# Patient Record
Sex: FEMALE | Race: WHITE | NOT HISPANIC OR LATINO | Employment: UNEMPLOYED | URBAN - METROPOLITAN AREA
[De-identification: names, ages, dates, MRNs, and addresses within clinical notes are randomized per-mention and may not be internally consistent; named-entity substitution may affect disease eponyms.]

---

## 2023-06-20 LAB
ABO GROUP (TYPE) IN BLOOD: NORMAL
ANTIBODY SCREEN: NORMAL
HEPATITIS B VIRUS SURFACE AG PRESENCE IN SERUM: NONREACTIVE
HEPATITIS C VIRUS AB PRESENCE IN SERUM: NONREACTIVE
HIV 1/ 2 AG/AB SCREEN: NONREACTIVE
RH FACTOR: NORMAL
RUBELLA VIRUS IGG AB: POSITIVE
SYPHILIS TOTAL AB: NONREACTIVE
VARICELLA ZOSTER IGG: POSITIVE

## 2023-06-21 LAB
CHLAMYDIA TRACH., AMPLIFIED: NEGATIVE
N. GONORRHEA, AMPLIFIED: NEGATIVE

## 2023-06-24 LAB — ANTI-MULLERIAN HORMONE (AMH): 9.95 NG/ML (ref 0.4–16.02)

## 2023-08-15 LAB — URINE CULTURE: NO GROWTH

## 2023-09-08 LAB
ABO GROUP (TYPE) IN BLOOD: NORMAL
ANTIBODY SCREEN: NORMAL
ERYTHROCYTE DISTRIBUTION WIDTH (RATIO) BY AUTOMATED COUNT: 12.3 % (ref 11.5–14.5)
ERYTHROCYTE MEAN CORPUSCULAR HEMOGLOBIN CONCENTRATION (G/DL) BY AUTOMATED: 32.4 G/DL (ref 32–36)
ERYTHROCYTE MEAN CORPUSCULAR VOLUME (FL) BY AUTOMATED COUNT: 94 FL (ref 80–100)
ERYTHROCYTES (10*6/UL) IN BLOOD BY AUTOMATED COUNT: 4.23 X10E12/L (ref 4–5.2)
HEMATOCRIT (%) IN BLOOD BY AUTOMATED COUNT: 39.8 % (ref 36–46)
HEMOGLOBIN (G/DL) IN BLOOD: 12.9 G/DL (ref 12–16)
HEPATITIS B VIRUS SURFACE AG PRESENCE IN SERUM: NONREACTIVE
HEPATITIS C VIRUS AB PRESENCE IN SERUM: NONREACTIVE
HIV 1/ 2 AG/AB SCREEN: NONREACTIVE
LEUKOCYTES (10*3/UL) IN BLOOD BY AUTOMATED COUNT: 9.4 X10E9/L (ref 4.4–11.3)
PLATELETS (10*3/UL) IN BLOOD AUTOMATED COUNT: 281 X10E9/L (ref 150–450)
REFLEX ADDED, ANEMIA PANEL: NORMAL
RH FACTOR: NORMAL
RUBELLA VIRUS IGG AB: POSITIVE
SYPHILIS TOTAL AB: NONREACTIVE

## 2023-09-09 LAB
CHLAMYDIA TRACH., AMPLIFIED: NEGATIVE
N. GONORRHEA, AMPLIFIED: NEGATIVE

## 2023-10-20 ENCOUNTER — APPOINTMENT (OUTPATIENT)
Dept: OBSTETRICS AND GYNECOLOGY | Facility: CLINIC | Age: 28
End: 2023-10-20
Payer: COMMERCIAL

## 2023-10-23 ENCOUNTER — APPOINTMENT (OUTPATIENT)
Dept: OBSTETRICS AND GYNECOLOGY | Facility: CLINIC | Age: 28
End: 2023-10-23
Payer: COMMERCIAL

## 2023-10-23 ENCOUNTER — ANCILLARY PROCEDURE (OUTPATIENT)
Dept: RADIOLOGY | Facility: CLINIC | Age: 28
End: 2023-10-23
Payer: COMMERCIAL

## 2023-10-23 DIAGNOSIS — Z34.02 ENCOUNTER FOR SUPERVISION OF NORMAL FIRST PREGNANCY IN SECOND TRIMESTER (HHS-HCC): Primary | ICD-10-CM

## 2023-10-23 DIAGNOSIS — Z34.90 ENCOUNTER FOR SUPERVISION OF NORMAL PREGNANCY, UNSPECIFIED, UNSPECIFIED TRIMESTER (HHS-HCC): ICD-10-CM

## 2023-10-23 PROCEDURE — 76811 OB US DETAILED SNGL FETUS: CPT

## 2023-10-23 PROCEDURE — 76811 OB US DETAILED SNGL FETUS: CPT | Performed by: OBSTETRICS & GYNECOLOGY

## 2023-10-24 ENCOUNTER — ROUTINE PRENATAL (OUTPATIENT)
Dept: OBSTETRICS AND GYNECOLOGY | Facility: CLINIC | Age: 28
End: 2023-10-24
Payer: COMMERCIAL

## 2023-10-24 VITALS
DIASTOLIC BLOOD PRESSURE: 80 MMHG | BODY MASS INDEX: 29.31 KG/M2 | SYSTOLIC BLOOD PRESSURE: 124 MMHG | WEIGHT: 204.25 LBS

## 2023-10-24 DIAGNOSIS — R00.2 HEART PALPITATIONS: Primary | ICD-10-CM

## 2023-10-24 DIAGNOSIS — Z3A.19 19 WEEKS GESTATION OF PREGNANCY (HHS-HCC): ICD-10-CM

## 2023-10-24 PROCEDURE — 0501F PRENATAL FLOW SHEET: CPT | Performed by: ADVANCED PRACTICE MIDWIFE

## 2023-10-24 NOTE — PROGRESS NOTES
Subjective     Cat Sarkar is a 28 y.o.  at 19w2d with a working estimated date of delivery of 3/17/2024, by Last Menstrual Period who presents for a routine prenatal visit. No OB complaints but has been having more headaches, hx of migraines. Also has been having palpitations when she lays down or lays on her side for the past 5 weeks.   Of note, pt reports that her 52 yo father had heartache 3 days ago, found congenital heart defect (bicuspid aortic valve). He is in [place] and is planning to have surgery at the [facility] once able to travel  Had her anatomy U/S, Suboptimal views of heart, repeat in 2 weeks. Explained that [facility] may recommend a fetal echo given her fathers CHD.    Her pregnancy is complicated by:  carrier for smith-lemli-opitz   Anterior placenta    Objective   Physical Exam:   Weight: 92.6 kg (204 lb 4 oz)  TW.278 kg (18 lb 4 oz)  Expected Total Weight Gain: 7 kg (15 lb)-11.5 kg (25 lb)   Pregravid BMI: 26.69  BP: 124/80  Fetal Heart Rate: 145               Postpartum Depression: Not on file        Prenatal Labs  Lab Results   Component Value Date    HGB 12.9 2023    HCT 39.8 2023     2023    ABO B 2023    LABRH POS 2023    NEISSGONOAMP NEGATIVE 2023    CHLAMTRACAMP NEGATIVE 2023    SYPHT NONREACTIVE 2023    HEPBSAG NONREACTIVE 2023    HIV1X2 NONREACTIVE 2023    URINECULTURE NO GROWTH 2023         Assessment/Plan   Headaches  Tylenol 1000 mg Q 6 hrs PRN  400 Magnesium for prevention  Adequate hydration  Palpitations  Referral to cardiology  Return for completion of anatomy U/S  Continue prenatal vitamins  Glucola supplies provided to pt to complete at her [facility]  Order 1 hr and CBC at [facility]  Follow up in 5 week(s) for a routine prenatal visit or sooner as needed.    KARY Carl-HUMERA

## 2023-10-27 PROBLEM — N92.6 IRREGULAR MENSES: Status: ACTIVE | Noted: 2023-10-27

## 2023-10-27 PROBLEM — R63.5 ABNORMAL WEIGHT GAIN: Status: ACTIVE | Noted: 2023-10-27

## 2023-10-27 PROBLEM — M54.50 CHRONIC MIDLINE LOW BACK PAIN WITHOUT SCIATICA: Status: ACTIVE | Noted: 2019-10-11

## 2023-10-27 PROBLEM — G89.29 OTHER CHRONIC PAIN: Status: ACTIVE | Noted: 2019-10-11

## 2023-10-27 PROBLEM — J02.9 SORE THROAT: Status: ACTIVE | Noted: 2023-10-27

## 2023-10-27 PROBLEM — G89.29 CHRONIC MIDLINE LOW BACK PAIN WITHOUT SCIATICA: Status: ACTIVE | Noted: 2019-10-11

## 2023-10-27 PROBLEM — N97.9 INFERTILITY, FEMALE: Status: ACTIVE | Noted: 2023-10-27

## 2023-10-30 ENCOUNTER — HOSPITAL ENCOUNTER (OUTPATIENT)
Dept: CARDIOLOGY | Facility: CLINIC | Age: 28
End: 2023-10-30
Payer: COMMERCIAL

## 2023-10-30 ENCOUNTER — HOSPITAL ENCOUNTER (OUTPATIENT)
Dept: CARDIOLOGY | Facility: CLINIC | Age: 28
Discharge: HOME | End: 2023-10-30
Payer: COMMERCIAL

## 2023-10-30 ENCOUNTER — OFFICE VISIT (OUTPATIENT)
Dept: CARDIOLOGY | Facility: CLINIC | Age: 28
End: 2023-10-30
Payer: COMMERCIAL

## 2023-10-30 VITALS
DIASTOLIC BLOOD PRESSURE: 64 MMHG | HEIGHT: 70 IN | HEART RATE: 76 BPM | SYSTOLIC BLOOD PRESSURE: 112 MMHG | BODY MASS INDEX: 29.49 KG/M2 | OXYGEN SATURATION: 97 % | WEIGHT: 206 LBS

## 2023-10-30 DIAGNOSIS — R00.2 PALPITATIONS: ICD-10-CM

## 2023-10-30 DIAGNOSIS — Z34.90 PREGNANCY, UNSPECIFIED GESTATIONAL AGE (HHS-HCC): ICD-10-CM

## 2023-10-30 DIAGNOSIS — Z34.90 ENCOUNTER FOR SUPERVISION OF NORMAL PREGNANCY, UNSPECIFIED, UNSPECIFIED TRIMESTER (HHS-HCC): ICD-10-CM

## 2023-10-30 DIAGNOSIS — R00.2 PALPITATIONS: Primary | ICD-10-CM

## 2023-10-30 PROCEDURE — 93005 ELECTROCARDIOGRAM TRACING: CPT | Performed by: NURSE PRACTITIONER

## 2023-10-30 PROCEDURE — 99213 OFFICE O/P EST LOW 20 MIN: CPT | Mod: 25 | Performed by: NURSE PRACTITIONER

## 2023-10-30 PROCEDURE — 99203 OFFICE O/P NEW LOW 30 MIN: CPT | Performed by: NURSE PRACTITIONER

## 2023-10-30 PROCEDURE — 93010 ELECTROCARDIOGRAM REPORT: CPT | Performed by: INTERNAL MEDICINE

## 2023-10-30 NOTE — PROGRESS NOTES
"Cat Sarkar is a 28 y.o. female     History Of Present Illness   Mrs Sarkar is a 28 year old pregnant female here for concern of \"feeling her heart beat.\"  The patient reports that she is 20 weeks pregnant and feels like her heart is beating hard \"and feels like its under water.\"  She admits that her heart beat is not irregular or fast.  She denies any complaints of chest pain, shortness of breath, lower extremity edema, dizziness or syncope.    She reports that her father was recently dx with a \"bicuspid valve.\"      Social HX  Never smoked  No alcohol or caffeine use during her pregnancy  Social History     Tobacco Use    Smoking status: Former     Types: Cigarettes   Substance Use Topics    Alcohol use: Not Currently    Drug use: Never          Family HX  Family History   Problem Relation Name Age of Onset    Hypertension Father      Diabetes type II Father      Cerebral aneurysm Father      Heart attack Maternal Grandfather      Diabetes type II Paternal Grandmother      Colon cancer Paternal Grandfather      Diabetes type II Paternal Grandfather            Review Of Systems   Constitutional: not feeling tired.   Eyes: no eyesight problems.   ENT: no hearing loss and no nosebleeds.   Cardiovascular: Feels a pronounced heart beat during the evening hours.  Respiratory: no chronic cough and no shortness of breath.   Gastrointestinal: no change in bowel habits and no blood in stools.   Genitourinary: no urinary frequency.   Skin: no skin rashes.   Neurological: no seizures and no frequent falls.   Psychiatric: no depression and not suicidal.   All other systems have been reviewed and are negative for complaint.      Allergies  No Known Allergies       Vitals  There were no vitals taken for this visit.        Physical Exam  Constitutional: alert and in no acute distress.   Eyes: no erythema, swelling or discharge from the eye .   Neck: neck is supple, symmetric, trachea midline, no masses  and no thyromegaly . "   Pulmonary: no increased work of breathing or signs of respiratory distress  and lungs clear to auscultation.  Auscultation of the lungs revealed no expiratory wheezing, normal expiratory time and no inspiratory wheezing. no rales or crackles were heard bilaterally. no rhonchi. no friction rub. no wheezing. no diminished breath sounds. no bronchial breath sounds.   Cardiovascular: carotid pulses 2+ bilaterally with no bruit  right carotid pulse 2+, no bruit heard over the right carotid,   Left carotid pulse 2+ and no bruit heard over the left carotid,   JVP was normal, no thrills ,   Regular rhythm, normal S1 and S2, no murmurs  the heart rate was normal normal S1, normal S2, no S3, no S4 no murmurs were heard.,   pedal pulses 2+ bilaterally  and no edema .   Abdomen: 20 weeks pregnant.  Skin: skin warm and dry, normal skin turgor .   Psychiatric judgment and insight is normal  and oriented to person, place and time .           Current/Home Meds    Current Outpatient Medications:     prenatal no115/iron/folic acid (PRENATAL 19 ORAL), Take 1 tablet by mouth once daily., Disp: , Rfl:        Labs           EKG Findings  EKG: NSR with no arrhythmias or ST changes        Cardiac Service Results:  No results found for this or any previous visit from the past 365 days.        Assessment/Plan      PRONOUNCED PALPITATIONS:  Patient is complaining of pronounced palpitations during the evning hours.  EKG today shows NSR.  May be a result of the increased work load of the heart during her pregnancy, however I will place a 5 day holter monitor and have her under go an echocardiogram.  She will follow up with me after her testing to review the results of her testing.

## 2023-10-31 ENCOUNTER — ROUTINE PRENATAL (OUTPATIENT)
Dept: OBSTETRICS AND GYNECOLOGY | Facility: CLINIC | Age: 28
End: 2023-10-31
Payer: COMMERCIAL

## 2023-10-31 VITALS
SYSTOLIC BLOOD PRESSURE: 110 MMHG | WEIGHT: 205.25 LBS | DIASTOLIC BLOOD PRESSURE: 72 MMHG | BODY MASS INDEX: 29.45 KG/M2

## 2023-10-31 DIAGNOSIS — Z34.02 ENCOUNTER FOR SUPERVISION OF NORMAL FIRST PREGNANCY IN SECOND TRIMESTER (HHS-HCC): Primary | ICD-10-CM

## 2023-10-31 DIAGNOSIS — Z3A.20 20 WEEKS GESTATION OF PREGNANCY (HHS-HCC): ICD-10-CM

## 2023-10-31 PROBLEM — R00.2 PALPITATIONS: Status: ACTIVE | Noted: 2023-10-31

## 2023-10-31 PROBLEM — G89.29 CHRONIC MIDLINE LOW BACK PAIN WITHOUT SCIATICA: Status: RESOLVED | Noted: 2019-10-11 | Resolved: 2023-10-31

## 2023-10-31 PROBLEM — G89.29 OTHER CHRONIC PAIN: Status: RESOLVED | Noted: 2019-10-11 | Resolved: 2023-10-31

## 2023-10-31 PROBLEM — M54.50 CHRONIC MIDLINE LOW BACK PAIN WITHOUT SCIATICA: Status: RESOLVED | Noted: 2019-10-11 | Resolved: 2023-10-31

## 2023-10-31 PROBLEM — N97.9 INFERTILITY, FEMALE: Status: RESOLVED | Noted: 2023-10-27 | Resolved: 2023-10-31

## 2023-10-31 PROBLEM — J02.9 SORE THROAT: Status: RESOLVED | Noted: 2023-10-27 | Resolved: 2023-10-31

## 2023-10-31 PROBLEM — N92.6 IRREGULAR MENSES: Status: RESOLVED | Noted: 2023-10-27 | Resolved: 2023-10-31

## 2023-10-31 LAB
ATRIAL RATE: 68 BPM
P AXIS: 52 DEGREES
P OFFSET: 193 MS
P ONSET: 138 MS
PR INTERVAL: 168 MS
Q ONSET: 222 MS
QRS COUNT: 11 BEATS
QRS DURATION: 84 MS
QT INTERVAL: 400 MS
QTC CALCULATION(BAZETT): 425 MS
QTC FREDERICIA: 417 MS
R AXIS: 55 DEGREES
T AXIS: 6 DEGREES
T OFFSET: 422 MS
VENTRICULAR RATE: 68 BPM

## 2023-10-31 PROCEDURE — 0501F PRENATAL FLOW SHEET: CPT | Performed by: OBSTETRICS & GYNECOLOGY

## 2023-10-31 NOTE — PROGRESS NOTES
Routine prenatal visit     Doing ok.  Has been having palpitations - saw cardiology yesterday and is wearing Holter monitor.  Has echo scheduled.  Also has f/u appt with cards scheduled.  Had normal but incomplete anatomy USN - f/u USN scheduled.  Has glucola supplies for next visit.  Hasn't had flu vaccine - agreed to get today but then left prior to getting - if she hasn't gotten it by next visit, will give then.      Medical Problems       Problem List       Encounter for supervision of normal first pregnancy in second trimester    Overview Addendum 10/31/2023 10:42 AM by Tiffanie Sarmiento MD     - will get flu vaccine today   - Recommended updated COVID vaccine   - Risks-reducing NIPS  - It's a BOY   - normal but incomplete anatomy USN - has f/u USN scheduled 2 weeks          Palpitations    Overview Signed 10/31/2023 10:42 AM by Tiffanie Sarmiento MD     - saw cardiology - has f/u scheduled   - Holter monitor   - Has echo scheduled                Follow up in 4 week(s).

## 2023-11-06 ENCOUNTER — ANCILLARY PROCEDURE (OUTPATIENT)
Dept: RADIOLOGY | Facility: CLINIC | Age: 28
End: 2023-11-06
Payer: COMMERCIAL

## 2023-11-06 DIAGNOSIS — Z34.90 ENCOUNTER FOR SUPERVISION OF NORMAL PREGNANCY, UNSPECIFIED, UNSPECIFIED TRIMESTER (HHS-HCC): ICD-10-CM

## 2023-11-06 PROCEDURE — 76816 OB US FOLLOW-UP PER FETUS: CPT

## 2023-11-06 PROCEDURE — 76816 OB US FOLLOW-UP PER FETUS: CPT | Performed by: OBSTETRICS & GYNECOLOGY

## 2023-11-09 ENCOUNTER — HOSPITAL ENCOUNTER (OUTPATIENT)
Dept: CARDIOLOGY | Facility: CLINIC | Age: 28
Discharge: HOME | End: 2023-11-09
Payer: COMMERCIAL

## 2023-11-09 DIAGNOSIS — Z34.90 PREGNANCY, UNSPECIFIED GESTATIONAL AGE (HHS-HCC): ICD-10-CM

## 2023-11-09 DIAGNOSIS — R00.2 PALPITATIONS: ICD-10-CM

## 2023-11-09 PROCEDURE — 93306 TTE W/DOPPLER COMPLETE: CPT

## 2023-11-09 PROCEDURE — 93306 TTE W/DOPPLER COMPLETE: CPT | Performed by: STUDENT IN AN ORGANIZED HEALTH CARE EDUCATION/TRAINING PROGRAM

## 2023-11-16 LAB
EJECTION FRACTION APICAL 4 CHAMBER: 46.9
EJECTION FRACTION: 52
LEFT ATRIUM VOLUME AREA LENGTH INDEX BSA: 22.7
LEFT VENTRICLE INTERNAL DIMENSION DIASTOLE: 5 (ref 3.5–6)
LEFT VENTRICULAR OUTFLOW TRACT DIAMETER: 1.9
MITRAL VALVE E/A RATIO: 1.97
MITRAL VALVE E/E' RATIO: 8.08
RIGHT VENTRICLE FREE WALL PEAK S': 10.9

## 2023-11-28 ENCOUNTER — ROUTINE PRENATAL (OUTPATIENT)
Dept: OBSTETRICS AND GYNECOLOGY | Facility: CLINIC | Age: 28
End: 2023-11-28
Payer: COMMERCIAL

## 2023-11-28 ENCOUNTER — APPOINTMENT (OUTPATIENT)
Dept: OBSTETRICS AND GYNECOLOGY | Facility: CLINIC | Age: 28
End: 2023-11-28
Payer: COMMERCIAL

## 2023-11-28 VITALS — BODY MASS INDEX: 29.39 KG/M2 | WEIGHT: 204.8 LBS | SYSTOLIC BLOOD PRESSURE: 126 MMHG | DIASTOLIC BLOOD PRESSURE: 70 MMHG

## 2023-11-28 DIAGNOSIS — Z34.02 ENCOUNTER FOR SUPERVISION OF NORMAL FIRST PREGNANCY IN SECOND TRIMESTER (HHS-HCC): Primary | ICD-10-CM

## 2023-11-28 DIAGNOSIS — Z23 ENCOUNTER FOR VACCINATION: ICD-10-CM

## 2023-11-28 DIAGNOSIS — Z13.1 SCREENING FOR DIABETES MELLITUS: ICD-10-CM

## 2023-11-28 DIAGNOSIS — Z3A.24 24 WEEKS GESTATION OF PREGNANCY (HHS-HCC): ICD-10-CM

## 2023-11-28 LAB
ERYTHROCYTE [DISTWIDTH] IN BLOOD BY AUTOMATED COUNT: 13.1 % (ref 11.5–14.5)
GLUCOSE 1H P 50 G GLC PO SERPL-MCNC: 146 MG/DL
HCT VFR BLD AUTO: 35.5 % (ref 36–46)
HGB BLD-MCNC: 11.5 G/DL (ref 12–16)
MCH RBC QN AUTO: 30.9 PG (ref 26–34)
MCHC RBC AUTO-ENTMCNC: 32.4 G/DL (ref 32–36)
MCV RBC AUTO: 95 FL (ref 80–100)
NRBC BLD-RTO: 0 /100 WBCS (ref 0–0)
PLATELET # BLD AUTO: 244 X10*3/UL (ref 150–450)
RBC # BLD AUTO: 3.72 X10*6/UL (ref 4–5.2)
REFLEX ADDED, ANEMIA PANEL: NORMAL
WBC # BLD AUTO: 10.4 X10*3/UL (ref 4.4–11.3)

## 2023-11-28 PROCEDURE — 82947 ASSAY GLUCOSE BLOOD QUANT: CPT

## 2023-11-28 PROCEDURE — 90686 IIV4 VACC NO PRSV 0.5 ML IM: CPT | Performed by: OBSTETRICS & GYNECOLOGY

## 2023-11-28 PROCEDURE — 36415 COLL VENOUS BLD VENIPUNCTURE: CPT

## 2023-11-28 PROCEDURE — 0501F PRENATAL FLOW SHEET: CPT | Performed by: OBSTETRICS & GYNECOLOGY

## 2023-11-28 PROCEDURE — 90471 IMMUNIZATION ADMIN: CPT | Performed by: OBSTETRICS & GYNECOLOGY

## 2023-11-28 PROCEDURE — 85027 COMPLETE CBC AUTOMATED: CPT

## 2023-11-28 NOTE — PROGRESS NOTES
GTT/CBC today, Rh+  FLU shot today    Matilde Blanco MA II    Routine prenatal visit   Says she has been feeling great overall.  Has f/u appt with cards scheduled.  Has f/u USN due to some structures poorly visualized with MFM as well as fetal echo.  Glucola today.  Rh positive.  Flu vaccine given today. Tdap next visit.     Medical Problems       Problem List       Encounter for supervision of normal first pregnancy in second trimester    Overview Addendum 11/28/2023  8:49 AM by Tiffanie Sarmiento MD     - s/p flu vaccine   - Recommended updated COVID vaccine   - Risks-reducing NIPS  - It's a BOY   - Some structures poorly visualized on anatomy USN --> f/u USN scheduled 12/4   - Fetal echo scheduled 12/1          Palpitations    Overview Addendum 11/28/2023  8:49 AM by Tiffanie Sarmiento MD     - saw cardiology - has f/u scheduled with cards on 12/8   - s/p Holter monitor              Follow up in 4 week(s).

## 2023-11-30 DIAGNOSIS — O99.810 IMPAIRED GLUCOSE TOLERANCE DURING PREGNANCY (HHS-HCC): Primary | ICD-10-CM

## 2023-12-01 ENCOUNTER — ANCILLARY PROCEDURE (OUTPATIENT)
Dept: PEDIATRIC CARDIOLOGY | Facility: CLINIC | Age: 28
End: 2023-12-01
Payer: COMMERCIAL

## 2023-12-01 ENCOUNTER — OFFICE VISIT (OUTPATIENT)
Dept: PEDIATRIC CARDIOLOGY | Facility: CLINIC | Age: 28
End: 2023-12-01
Payer: COMMERCIAL

## 2023-12-01 VITALS
DIASTOLIC BLOOD PRESSURE: 68 MMHG | BODY MASS INDEX: 29.7 KG/M2 | WEIGHT: 207.45 LBS | HEART RATE: 66 BPM | HEIGHT: 70 IN | SYSTOLIC BLOOD PRESSURE: 104 MMHG

## 2023-12-01 DIAGNOSIS — O35.9XX0 SUSPECTED FETAL ANOMALY, ANTEPARTUM, SINGLE OR UNSPECIFIED FETUS (HHS-HCC): Primary | ICD-10-CM

## 2023-12-01 DIAGNOSIS — O35.8XX0 MATERNAL CARE FOR OTHER (SUSPECTED) FETAL ABNORMALITY AND DAMAGE, NOT APPLICABLE OR UNSPECIFIED (HHS-HCC): ICD-10-CM

## 2023-12-01 DIAGNOSIS — Z82.79 FAMILY HISTORY OF BICUSPID AORTIC VALVE: ICD-10-CM

## 2023-12-01 PROCEDURE — 76827 ECHO EXAM OF FETAL HEART: CPT | Performed by: PEDIATRICS

## 2023-12-01 PROCEDURE — 76827 ECHO EXAM OF FETAL HEART: CPT

## 2023-12-01 PROCEDURE — 99214 OFFICE O/P EST MOD 30 MIN: CPT | Mod: 25,27 | Performed by: PEDIATRICS

## 2023-12-01 PROCEDURE — 99204 OFFICE O/P NEW MOD 45 MIN: CPT | Performed by: PEDIATRICS

## 2023-12-01 NOTE — PROGRESS NOTES
[facility]  [facility]  Division of Pediatric Cardiology  [facility] [place]   [address], [address], [place], [place] [address]  Tel: [phone], Fax: [phone]     Obstetrician: Tiffanie Sarmiento    Cat Sarkar was seen at the request of Gabe Griffith for incomplete cardiac views on obstetric ultrasound.  Records were reviewed, and a summary of those records is integrated within the history of present illness.  A report with my findings is being sent via written or electronic means to the referring physician with my recommendations    Accompanied by:  Self  History obtained from:  Self    History of Presentation   History of Present Illness:   Cat Sarkar is a 28 y.o. female presenting for initial prenatal cardiology consultation and fetal echocardiogram for incomplete cardiac views on obstetric ultrasound.  She is  and approximately 24w5d weeks pregnant (Patient's last menstrual period was 2023., Estimated Date of Delivery: 3/17/24) with a male fetus.  There have been no complications with her pregnancy.  Ms. Cat Sarkar had a normal first trimester screen.  Her level 2 obstetric ultrasound for anatomy was performed at 18, and 20 weeks gestational age and was normal but with incomplete cardiac views.  She had undergone cell free fetal DNA testing and it was normal.  She has not had invasive genetic testing such as amniocentesis or chorionic villous sampling.  This pregnancy was not the result of in vitro fertilization.  She was not using potentially teratogenic medication at the time of conception.  There have been no other complications of this pregnancy.  Ms. Cat Sarkar plans to deliver her baby at [facility].    Ms. Cat Srakar feels well today.  She denies any shortness of breath, abdominal cramping, contractions, bleeding, or swelling of the extremities.  She notes  frequent fetal movement.        Medical History     Medical Conditions:  Patient Active Problem List   Diagnosis    Encounter for supervision of normal first pregnancy in second trimester    Palpitations     Past Surgeries:  Past Surgical History:   Procedure Laterality Date    OTHER SURGICAL HISTORY  06/24/2022    No history of surgery       Current Outpatient Medications   Medication Instructions    prenatal no115/iron/folic acid (PRENATAL 19 ORAL) 1 tablet, oral, Daily      Allergies:  Patient has no known allergies.    Social History:  Patient lives with  her spouse .    Occupation: student  Smoking: None  Alcohol: None  Drug Use: None  She wears a seatbelt while in the car. She denies any verbal, sexual or physical abuse.     Cardiac Family History (for patient and father of baby):  Cat's father has a bicuspid aortic valve. She has been screened and does not have a bicuspid aortic valve. There is no other history of congenital heart disease.  There is no history of early sudden/unexplained death including SIDS and drowning.  There is no history of cardiomyopathy of any type or heart transplant.  There is no history of arrhythmias/pacemaker/defibrillator or arrhythmia syndromes, including Long QT syndrome, Kathie-Parkinson-White syndrome or Brugada syndrome.  There is no history of heart attack or stroke before the age of 55 years in a close family member.  There is no history of Marfan syndrome or aortic aneurysm.  There is no history of deafness.  There is no history of syncope/fainting.  There is no history of high blood pressure or high cholesterol.  There is no history of DiGeorge Syndrome (22q11).    Physical Examination     [facility] 06/11/2023     General: Alert, well-appearing and in no acute distress.    Abdomen: Soft, nontender, not distended. Gravid.  Extremities: No swelling or edema.  Neurologic / Psychiatric: Grossly intact without focal deficits.  Appropriate demeanor.      Results     Fetal  Echocardiogram:    I ordered and interpreted a transabdominal fetal echocardiogram.  I performed a portion of the study myself.  The complete report is available under separate cover.  The results are summarized as follows:    Image quality: Good  Cardiac situs: Cardiac mass in the left chest.  Left ventricular apex points leftward.  Segmental anatomy: Atrio-ventricular concordance.  Ventriculo-arterial concordance.  Normally-related great arteries.  Superior vena cava: Normal connection to the right atrium.  Inferior vena cava: Normal connection to the right atrium.  Pulmonary veins: At least one pulmonary vein connects normally to the left atrium.  Atrial septum: Patent foramen ovale, with flap valve bowing into the left atrium.  Tricuspid valve: Structurally normal.  No obvious stenosis or insufficiency.  Mitral valve: Structurally normal.  No obvious stenosis or insufficiency.  Right ventricle: Normal ventricular size, wall thickness, and systolic function (qualitative).  Left ventricle: Normal ventricular size, wall thickness, and systolic function (qualitative).  Interventricular septum: No obvious septal defect.  Aortic valve: Structurally normal.  No obvious stenosis or insufficiency.  Pulmonary valve: Structurally normal.  No obvious stenosis or insufficiency.  Pulmonary artery: Normal in size.  Ductal arch: Patent with right to left shunting.  Aortic arch: Left-sided.  Patent.  Pericardial effusion: None.  Umbilical arteries: Two umbilical arteries.  Normal arterial flow pattern.  Umbilical vein: Normal venous flow pattern.  Electrophysiology: Normal fetal heart rate and rhythm.  Normal mechanical [place] interval.      Assessment & Plan   Assessment:  Ms. Cat Sarkar is a 28 y.o. female who is  and currently at 24 5/7 weeks gestational age with a male fetus.  A fetal echocardiogram was performed today for incomplete cardiac views on obstetric ultrasound.     Fetal echocardiogram today demonstrated grossly  normal fetal cardiac anatomy, qualitatively normal fetal cardiac function, normal fetal heart rhythm, and no evidence of in utero congestive heart failure or hydrops fetalis.  I reviewed the results of today's evaluation, including the findings of the fetal echocardiogram, with Ms. Cat Sarkar in detail.      I discussed limitations of the technology of fetal echocardiography with Ms. Cat Sarkar in detail.  I explained that fetal echocardiography cannot exclude all forms of congenital heart disease.  Fetal echocardiography may be insensitive to some defects of atrial and ventricular septation, minor valvular abnormalities, partial anomalous pulmonary venous return, and coarctation of the aorta.  In addition, normal fetal echocardiogram does not ensure that the fetal ductus arteriosus or foramen ovale will close.      Recommendations:  No changes to prenatal care.    Further fetal cardiac imaging is not required at this time.  We would be happy to see Ms. Cat Sarkar in the future if new concerns arise.    Triage code 0:        No changes to delivery planning.  Delivery per obstetrics at patient´s preferred hospital.  Standard  care per  team.        No pediatric cardiology consult needed after birth unless there are concerns/issues.    I spent greater than 60 minutes in performance of this consultation, of which greater than 50% was related to coordination of care or counseling.    This assessment and plan, in addition to the results of relevant testing were explained to Cat. All questions were answered and understanding was demonstrated.    It was a pleasure to see Ms. Cat Sarkar today.  If you have any questions or concerns regarding this evaluation, do not hesitate to contact me.      Chica Mendoza MD, FACC, FAAP   of Pediatrics  Division of Pediatric Cardiology  [facility]  [place] [id number]  Phone: [phone]  Fax: [phone]  e-mail: [email or web address]

## 2023-12-01 NOTE — LETTER
2023     Gabe Griffith MD   [address] [facility]  [facility] [place] [address]  [facility] [address]    Patient: Cat Sarkar   YOB: 1995   Date of Visit: 2023       Dear Dr. Gabe Griffith MD:    Thank you for referring Cat Sarkar to me for evaluation. Below are my notes for this consultation.  If you have questions, please do not hesitate to call me. I look forward to following your patient along with you.       Sincerely,     Chica Mendoza MD      CC: Marlene Patterson, APRN-CNM, DNP  Jade Church, APRN-CNP  Tiffanie Sarmiento MD  ______________________________________________________________________________________         [facility]  [facility]  Division of Pediatric Cardiology  [facility] [address], [address] [address]  Tel: [phone], Fax: [phone]     Obstetrician: Tiffanie Sarmiento    Cat Sarkar was seen at the request of Gabe Griffith for incomplete cardiac views on obstetric ultrasound.  Records were reviewed, and a summary of those records is integrated within the history of present illness.  A report with my findings is being sent via written or electronic means to the referring physician with my recommendations    Accompanied by:  Self  History obtained from:  Self    History of Presentation   History of Present Illness:   Cat Sarkar is a 28 y.o. female presenting for initial prenatal cardiology consultation and fetal echocardiogram for incomplete cardiac views on obstetric ultrasound.  She is  and approximately 24w5d weeks pregnant (Patient's last menstrual period was 2023., Estimated Date of Delivery: 3/17/24) with a male fetus.  There have been no complications with her pregnancy.  Ms. Cat Sarkar had a normal first trimester screen.  Her level 2 obstetric ultrasound for anatomy was  performed at 18, and 20 weeks gestational age and was normal but with incomplete cardiac views.  She had undergone cell free fetal DNA testing and it was normal.  She has not had invasive genetic testing such as amniocentesis or chorionic villous sampling.  This pregnancy was not the result of in vitro fertilization.  She was not using potentially teratogenic medication at the time of conception.  There have been no other complications of this pregnancy.  Ms. Cat Sarkar plans to deliver her baby at [facility].    Ms. Cat Sarkar feels well today.  She denies any shortness of breath, abdominal cramping, contractions, bleeding, or swelling of the extremities.  She notes frequent fetal movement.        Medical History     Medical Conditions:  Patient Active Problem List   Diagnosis   • Encounter for supervision of normal first pregnancy in second trimester   • Palpitations     Past Surgeries:  Past Surgical History:   Procedure Laterality Date   • OTHER SURGICAL HISTORY  06/24/2022    No history of surgery       Current Outpatient Medications   Medication Instructions   • prenatal no115/iron/folic acid (PRENATAL 19 ORAL) 1 tablet, oral, Daily      Allergies:  Patient has no known allergies.    Social History:  Patient lives with  her spouse .    Occupation: student  Smoking: None  Alcohol: None  Drug Use: None  She wears a seatbelt while in the car. She denies any verbal, sexual or physical abuse.     Cardiac Family History (for patient and father of baby):  Cat's father has a bicuspid aortic valve. She has been screened and does not have a bicuspid aortic valve. There is no other history of congenital heart disease.  There is no history of early sudden/unexplained death including SIDS and drowning.  There is no history of cardiomyopathy of any type or heart transplant.  There is no history of arrhythmias/pacemaker/defibrillator or arrhythmia syndromes, including Long QT syndrome, Kathie-Parkinson-White syndrome  or Brugada syndrome.  There is no history of heart attack or stroke before the age of 55 years in a close family member.  There is no history of Marfan syndrome or aortic aneurysm.  There is no history of deafness.  There is no history of syncope/fainting.  There is no history of high blood pressure or high cholesterol.  There is no history of DiGeorge Syndrome (22q11).    Physical Examination     [facility] 06/11/2023     General: Alert, well-appearing and in no acute distress.    Abdomen: Soft, nontender, not distended. Gravid.  Extremities: No swelling or edema.  Neurologic / Psychiatric: Grossly intact without focal deficits.  Appropriate demeanor.      Results     Fetal Echocardiogram:    I ordered and interpreted a transabdominal fetal echocardiogram.  I performed a portion of the study myself.  The complete report is available under separate cover.  The results are summarized as follows:    Image quality: Good  Cardiac situs: Cardiac mass in the left chest.  Left ventricular apex points leftward.  Segmental anatomy: Atrio-ventricular concordance.  Ventriculo-arterial concordance.  Normally-related great arteries.  Superior vena cava: Normal connection to the right atrium.  Inferior vena cava: Normal connection to the right atrium.  Pulmonary veins: At least one pulmonary vein connects normally to the left atrium.  Atrial septum: Patent foramen ovale, with flap valve bowing into the left atrium.  Tricuspid valve: Structurally normal.  No obvious stenosis or insufficiency.  Mitral valve: Structurally normal.  No obvious stenosis or insufficiency.  Right ventricle: Normal ventricular size, wall thickness, and systolic function (qualitative).  Left ventricle: Normal ventricular size, wall thickness, and systolic function (qualitative).  Interventricular septum: No obvious septal defect.  Aortic valve: Structurally normal.  No obvious stenosis or insufficiency.  Pulmonary valve: Structurally normal.  No obvious stenosis  or insufficiency.  Pulmonary artery: Normal in size.  Ductal arch: Patent with right to left shunting.  Aortic arch: Left-sided.  Patent.  Pericardial effusion: None.  Umbilical arteries: Two umbilical arteries.  Normal arterial flow pattern.  Umbilical vein: Normal venous flow pattern.  Electrophysiology: Normal fetal heart rate and rhythm.  Normal mechanical OR interval.      Assessment & Plan   Assessment:  Ms. Cat Sarkar is a 28 y.o. female who is  and currently at 24 5/7 weeks gestational age with a male fetus.  A fetal echocardiogram was performed today for incomplete cardiac views on obstetric ultrasound.     Fetal echocardiogram today demonstrated grossly normal fetal cardiac anatomy, qualitatively normal fetal cardiac function, normal fetal heart rhythm, and no evidence of in utero congestive heart failure or hydrops fetalis.  I reviewed the results of today's evaluation, including the findings of the fetal echocardiogram, with Ms. Cat Sarkar in detail.      I discussed limitations of the technology of fetal echocardiography with Ms. Cat Sarkar in detail.  I explained that fetal echocardiography cannot exclude all forms of congenital heart disease.  Fetal echocardiography may be insensitive to some defects of atrial and ventricular septation, minor valvular abnormalities, partial anomalous pulmonary venous return, and coarctation of the aorta.  In addition, normal fetal echocardiogram does not ensure that the fetal ductus arteriosus or foramen ovale will close.      Recommendations:  No changes to prenatal care.    Further fetal cardiac imaging is not required at this time.  We would be happy to see Ms. Cat Sarkar in the future if new concerns arise.    Triage code 0:        No changes to delivery planning.  Delivery per obstetrics at patient´s preferred hospital.  Standard  care per  team.        No pediatric cardiology consult needed after birth unless there are  concerns/issues.    I spent greater than 60 minutes in performance of this consultation, of which greater than 50% was related to coordination of care or counseling.    This assessment and plan, in addition to the results of relevant testing were explained to Cat. All questions were answered and understanding was demonstrated.    It was a pleasure to see Ms. Cat Sarkar today.  If you have any questions or concerns regarding this evaluation, do not hesitate to contact me.      Chica Mendoza MD, FACC, FAAP   of Pediatrics  Division of Pediatric Cardiology  [facility] [place] [address]  Phone: [phone]  Fax: [phone]  e-mail: [email or web address]

## 2023-12-01 NOTE — PATIENT INSTRUCTIONS
On fetal echocardiogram today the structure, size, function (squeeze), and rhythm of your fetus' heart were normal.  There are some limitations to fetal echocardiogram as described below, and because it is not a perfect test it is important to know that we cannot rule out all possible heart problems (see below).  We will communicate these results with your obstetrician.    It was a pleasure to see you today.  We do not need to see you back for routine follow-up during the remainder of your pregnancy.  However, we would be happy to see you back if new issues or concerns arise.  After birth your baby does not need to see a cardiologist unless the pediatric team has concerns.  If you have any questions or concerns regarding this evaluation, please do not hesitate to contact me.    Chica Mendoza MD   of Pediatrics  Division of Pediatric Cardiology  [place] [address]  Phone: [phone]  Fax: [phone]  e-mail: [email or web address]    [email or web address]    Normal Fetal Echocardiogram    Today you had an ultrasound of your fetus' heart (fetal echocardiogram).  Based on all of the pictures taken today, the heart appears normal and is developing as expected for this point in your pregnancy.   Therefore, no further testing is recommended at this time.    Despite today´s normal findings, it is impossible to rule out all heart problems before birth.  This is partially because the fetus´ heart is so small, and our machine´s technology can only see structures down to a certain size.  It is also because blood flow in a fetus is different and more complicated than blood flow after birth.    Briefly:  ° Fetuses get oxygen from the placenta instead of their lungs.  High-oxygen (red) blood from the placenta comes back to the right side of the fetus´  heart.  ° Red blood crosses to the left side of the heart through a hole between the upper chambers of the heart, the foramen ovale.  The foramen ovale lets the red blood flow to the body.  ° Low-oxygen (blue) blood stays on the right side of the heart.  After leaving the heart, the blue blood flows through a special blood vessel known as the ductus arteriosus.  The ductus arteriosus allows the blue blood to bypass the lungs and return to the placenta to get oxygen.  ° After birth the foramen ovale and ductus arteriosus should close, but sometimes they do not.  It is impossible to predict in which children these structures will remain open.    ° Thus, on fetal echo we cannot rule out that your baby will have a patent foramen ovale (PFO) or patent ductus arteriosus (PDA) after birth.    In addition, fetal echocardiography can miss other heart defects including:  ° Small or medium-sized holes between the upper or lower heart chambers.  ° Minor abnormalities of the heart valves.  ° Narrowing of the main artery that goes to the body (coarctation of the aorta).  ° Other rare abnormalities of the veins or arteries.    If any of these defects are present, there should be findings after birth that will alert your child´s doctor that there is a problem and prompt further evaluation.  After delivery, if your pediatrician has any concerns, your child's heart can be reevaluated at that time.

## 2023-12-04 ENCOUNTER — ANCILLARY PROCEDURE (OUTPATIENT)
Dept: RADIOLOGY | Facility: CLINIC | Age: 28
End: 2023-12-04
Payer: COMMERCIAL

## 2023-12-04 DIAGNOSIS — Z34.02 ENCOUNTER FOR SUPERVISION OF NORMAL FIRST PREGNANCY IN SECOND TRIMESTER (HHS-HCC): ICD-10-CM

## 2023-12-04 PROCEDURE — 76816 OB US FOLLOW-UP PER FETUS: CPT | Performed by: OBSTETRICS & GYNECOLOGY

## 2023-12-04 PROCEDURE — 76816 OB US FOLLOW-UP PER FETUS: CPT

## 2023-12-07 ENCOUNTER — LAB (OUTPATIENT)
Dept: LAB | Facility: LAB | Age: 28
End: 2023-12-07
Payer: COMMERCIAL

## 2023-12-07 DIAGNOSIS — O99.810 IMPAIRED GLUCOSE TOLERANCE DURING PREGNANCY (HHS-HCC): ICD-10-CM

## 2023-12-07 LAB
GLUCOSE 1H P 100 G GLC PO SERPL-MCNC: 157 MG/DL
GLUCOSE P FAST SERPL-MCNC: 84 MG/DL

## 2023-12-07 PROCEDURE — 82951 GLUCOSE TOLERANCE TEST (GTT): CPT

## 2023-12-07 PROCEDURE — 82952 GTT-ADDED SAMPLES: CPT

## 2023-12-07 PROCEDURE — 36415 COLL VENOUS BLD VENIPUNCTURE: CPT

## 2023-12-07 PROCEDURE — 82950 GLUCOSE TEST: CPT

## 2023-12-08 ENCOUNTER — OFFICE VISIT (OUTPATIENT)
Dept: CARDIOLOGY | Facility: CLINIC | Age: 28
End: 2023-12-08
Payer: COMMERCIAL

## 2023-12-08 ENCOUNTER — HOSPITAL ENCOUNTER (OUTPATIENT)
Dept: CARDIOLOGY | Facility: CLINIC | Age: 28
End: 2023-12-08
Payer: COMMERCIAL

## 2023-12-08 VITALS
OXYGEN SATURATION: 97 % | HEART RATE: 79 BPM | WEIGHT: 210 LBS | BODY MASS INDEX: 30.06 KG/M2 | HEIGHT: 70 IN | DIASTOLIC BLOOD PRESSURE: 65 MMHG | SYSTOLIC BLOOD PRESSURE: 108 MMHG

## 2023-12-08 DIAGNOSIS — R00.2 PALPITATIONS: Primary | ICD-10-CM

## 2023-12-08 LAB
GLUCOSE 2H P 100 G GLC PO SERPL-MCNC: 125 MG/DL
GLUCOSE 3H P 100 G GLC PO SERPL-MCNC: 88 MG/DL

## 2023-12-08 PROCEDURE — 99212 OFFICE O/P EST SF 10 MIN: CPT | Performed by: NURSE PRACTITIONER

## 2023-12-08 ASSESSMENT — ENCOUNTER SYMPTOMS
NEUROLOGICAL NEGATIVE: 1
HEMATOLOGIC/LYMPHATIC NEGATIVE: 1
RESPIRATORY NEGATIVE: 1
ALLERGIC/IMMUNOLOGIC NEGATIVE: 1
ENDOCRINE NEGATIVE: 1
CONSTITUTIONAL NEGATIVE: 1
PALPITATIONS: 1
PSYCHIATRIC NEGATIVE: 1
EYES NEGATIVE: 1
MUSCULOSKELETAL NEGATIVE: 1

## 2023-12-08 NOTE — PROGRESS NOTES
Cat Sarkar is a 28 y.o. female     History Of Present Illness   Mrs Sarkar is a 25 week pregnant female here for a follow up of her palpiations and to review the results of her holter monitor and echocardiogram.  She states her pregnancy is progressing well with no complications.  She continues to have short episodes of palpiations, however they resolve on their own within seconds,.      Social HX  Social History     Tobacco Use    Smoking status: Former     Types: Cigarettes   Substance Use Topics    Alcohol use: Not Currently    Drug use: Never          Family HX  Family History   Problem Relation Name Age of Onset    Hypertension Father      Diabetes type II Father      Cerebral aneurysm Father      Heart attack Maternal Grandfather      Diabetes type II Paternal Grandmother      Colon cancer Paternal Grandfather      Diabetes type II Paternal Grandfather            Review Of Systems   Review of Systems   Constitutional: Negative.    HENT: Negative.     Eyes: Negative.    Respiratory: Negative.     Cardiovascular:  Positive for palpitations. Negative for chest pain and leg swelling.   Endocrine: Negative.    Genitourinary: Negative.    Musculoskeletal: Negative.    Skin: Negative.    Allergic/Immunologic: Negative.    Neurological: Negative.    Hematological: Negative.    Psychiatric/Behavioral: Negative.            Allergies  No Known Allergies       Vitals  There were no vitals taken for this visit.        Physical Exam  Physical Exam  Cardiovascular:      Rate and Rhythm: Normal rate and regular rhythm.      Chest Wall: No thrill.      Pulses: Normal pulses.      Heart sounds: Normal heart sounds, S1 normal and S2 normal. No murmur heard.     No friction rub. No gallop.   Pulmonary:      Effort: Pulmonary effort is normal.      Breath sounds: Normal breath sounds.   Abdominal:      Comments: 25 weeks pregnant   Musculoskeletal:         General: Normal range of motion.   Skin:     General: Skin is warm and dry.       Capillary Refill: Capillary refill takes less than 2 seconds.   Neurological:      General: No focal deficit present.      Mental Status: She is oriented to person, place, and time.   Psychiatric:         Mood and Affect: Mood normal.         Behavior: Behavior normal.         Thought Content: Thought content normal.         Judgment: Judgment normal.            Cardiac Service Results:  11/9/23 ECHO  1. Left ventricular systolic function is normal with a 55-60% estimated ejection fraction.     HOLTER MONITOR:  Sinus rhythm with sinus tachycardia with sinus arrhythmia  Max  (ONE OCCURRENCE OF VTACH WITH THE LONGEST EPISODE 4 BEATS)  MIN HR 55  AVERAGE HR 82  SVE <1%    Assessment/Plan      Palpitations:  I have reviewed her echo results with the patient that shows a normal LV systolic function with an EF of 55-60%  Her holter monitor showed a very bried (4 beat ) episode of tachycardia.  The patient notes these episodes of palpitations are only lasting seconds.  We discussed starting a beta blocker, however if these episodes are not distressing her that I feel we should wait until after her pregnancy.  She is to increase her fluid intake and change positions slowly.  Her BP is very well controlled at 108/65.  She will follow up with me after she has her son, however she can call me sooner for any questions or concerns.

## 2023-12-29 ENCOUNTER — ANCILLARY PROCEDURE (OUTPATIENT)
Dept: RADIOLOGY | Facility: CLINIC | Age: 28
End: 2023-12-29
Payer: COMMERCIAL

## 2023-12-29 DIAGNOSIS — Z34.02 ENCOUNTER FOR SUPERVISION OF NORMAL FIRST PREGNANCY IN SECOND TRIMESTER (HHS-HCC): ICD-10-CM

## 2023-12-29 PROCEDURE — 76816 OB US FOLLOW-UP PER FETUS: CPT | Performed by: OBSTETRICS & GYNECOLOGY

## 2023-12-29 PROCEDURE — 76819 FETAL BIOPHYS PROFIL W/O NST: CPT

## 2023-12-29 PROCEDURE — 76819 FETAL BIOPHYS PROFIL W/O NST: CPT | Performed by: OBSTETRICS & GYNECOLOGY

## 2023-12-29 PROCEDURE — 76816 OB US FOLLOW-UP PER FETUS: CPT

## 2024-01-03 ENCOUNTER — ROUTINE PRENATAL (OUTPATIENT)
Dept: OBSTETRICS AND GYNECOLOGY | Facility: CLINIC | Age: 29
End: 2024-01-03
Payer: COMMERCIAL

## 2024-01-03 VITALS — DIASTOLIC BLOOD PRESSURE: 70 MMHG | WEIGHT: 217.6 LBS | BODY MASS INDEX: 31.22 KG/M2 | SYSTOLIC BLOOD PRESSURE: 122 MMHG

## 2024-01-03 DIAGNOSIS — Z23 ENCOUNTER FOR VACCINATION: Primary | ICD-10-CM

## 2024-01-03 DIAGNOSIS — Z34.02 ENCOUNTER FOR SUPERVISION OF NORMAL FIRST PREGNANCY IN SECOND TRIMESTER (HHS-HCC): ICD-10-CM

## 2024-01-03 DIAGNOSIS — Z3A.29 29 WEEKS GESTATION OF PREGNANCY (HHS-HCC): ICD-10-CM

## 2024-01-03 PROCEDURE — 90471 IMMUNIZATION ADMIN: CPT | Performed by: OBSTETRICS & GYNECOLOGY

## 2024-01-03 PROCEDURE — 0501F PRENATAL FLOW SHEET: CPT | Performed by: OBSTETRICS & GYNECOLOGY

## 2024-01-03 PROCEDURE — 90715 TDAP VACCINE 7 YRS/> IM: CPT | Performed by: OBSTETRICS & GYNECOLOGY

## 2024-01-03 NOTE — PROGRESS NOTES
Birthing questions. Tdap today.     Matilde Blanco MA II     Routine prenatal visit   Doing well.  Tdap given today.  Had normal growth USN- no additional USNs recommended.  Normal fetal echo. Already got breast pump in the mail.  Discussed normal FM.     Medical Problems       Problem List       Encounter for supervision of normal first pregnancy in second trimester    Overview Addendum 1/3/2024 11:47 AM by Tiffanie Sarmiento MD     - s/p flu vaccine   - Recommended updated COVID vaccine   - s/p Tdap vaccine   - Risks-reducing NIPS  - It's a BOY   - normal fetal echo   - Has breast pump          Palpitations    Overview Addendum 11/28/2023  8:49 AM by Tiffanie Sarmiento MD     - saw cardiology - has f/u scheduled with cards on 12/8   - s/p Holter monitor          Suspected fetal anomaly, antepartum        Follow up in 2 week(s).

## 2024-01-16 ENCOUNTER — ROUTINE PRENATAL (OUTPATIENT)
Dept: OBSTETRICS AND GYNECOLOGY | Facility: CLINIC | Age: 29
End: 2024-01-16
Payer: COMMERCIAL

## 2024-01-16 VITALS — SYSTOLIC BLOOD PRESSURE: 126 MMHG | WEIGHT: 217.8 LBS | BODY MASS INDEX: 31.25 KG/M2 | DIASTOLIC BLOOD PRESSURE: 64 MMHG

## 2024-01-16 DIAGNOSIS — Z34.03 ENCOUNTER FOR SUPERVISION OF NORMAL FIRST PREGNANCY IN THIRD TRIMESTER (HHS-HCC): ICD-10-CM

## 2024-01-16 DIAGNOSIS — Z3A.31 31 WEEKS GESTATION OF PREGNANCY (HHS-HCC): Primary | ICD-10-CM

## 2024-01-16 PROBLEM — O35.9XX0 SUSPECTED FETAL ANOMALY, ANTEPARTUM (HHS-HCC): Status: RESOLVED | Noted: 2023-12-01 | Resolved: 2024-01-16

## 2024-01-16 PROCEDURE — 0501F PRENATAL FLOW SHEET: CPT | Performed by: OBSTETRICS & GYNECOLOGY

## 2024-01-16 NOTE — PROGRESS NOTES
Routine prenatal visit   Feeling OK.  Discussed RSV vaccine - pt says she does not plan to get.  Normal FM.      Medical Problems       Problem List       Encounter for supervision of normal first pregnancy in second trimester    Overview Addendum 1/16/2024  9:49 AM by Tiffanie Sarmiento MD     - s/p flu vaccine   - Recommended updated COVID vaccine   - s/p Tdap vaccine   - Risks-reducing NIPS  - It's a BOY   - normal fetal echo   - Has breast pump   - Discussed RSV vaccine -does not plan to get          Palpitations    Overview Addendum 11/28/2023  8:49 AM by Tiffanie Sarmiento MD     - saw cardiology - has f/u scheduled with cards on 12/8   - s/p Holter monitor              Follow up in 2 week(s).

## 2024-01-30 ENCOUNTER — ROUTINE PRENATAL (OUTPATIENT)
Dept: OBSTETRICS AND GYNECOLOGY | Facility: CLINIC | Age: 29
End: 2024-01-30
Payer: COMMERCIAL

## 2024-01-30 VITALS — DIASTOLIC BLOOD PRESSURE: 64 MMHG | WEIGHT: 221.2 LBS | SYSTOLIC BLOOD PRESSURE: 118 MMHG | BODY MASS INDEX: 31.74 KG/M2

## 2024-01-30 DIAGNOSIS — Z3A.33 33 WEEKS GESTATION OF PREGNANCY (HHS-HCC): ICD-10-CM

## 2024-01-30 DIAGNOSIS — Z34.02 ENCOUNTER FOR SUPERVISION OF NORMAL FIRST PREGNANCY IN SECOND TRIMESTER (HHS-HCC): Primary | ICD-10-CM

## 2024-01-30 PROCEDURE — 0501F PRENATAL FLOW SHEET: CPT | Performed by: OBSTETRICS & GYNECOLOGY

## 2024-01-30 NOTE — PROGRESS NOTES
Routine prenatal visit   No complaints today.  Feeling well.  Normal FM. Took hospital tour and was happy.  Discussed how to reach the on-call physician after-hours.     Medical Problems       Problem List       Encounter for supervision of normal first pregnancy in second trimester    Overview Addendum 1/16/2024  9:49 AM by Tiffanie Sarmiento MD     - s/p flu vaccine   - Recommended updated COVID vaccine   - s/p Tdap vaccine   - Risks-reducing NIPS  - It's a BOY   - normal fetal echo   - Has breast pump   - Discussed RSV vaccine -does not plan to get          Palpitations    Overview Addendum 1/30/2024  1:22 PM by Tiffanie Sarmiento MD     - saw cardiology  - s/p Holter monitor              Follow up in 2 week(s).

## 2024-02-12 ENCOUNTER — ROUTINE PRENATAL (OUTPATIENT)
Dept: OBSTETRICS AND GYNECOLOGY | Facility: CLINIC | Age: 29
End: 2024-02-12
Payer: COMMERCIAL

## 2024-02-12 VITALS
DIASTOLIC BLOOD PRESSURE: 68 MMHG | SYSTOLIC BLOOD PRESSURE: 122 MMHG | BODY MASS INDEX: 31.91 KG/M2 | WEIGHT: 222.38 LBS

## 2024-02-12 DIAGNOSIS — Z3A.35 35 WEEKS GESTATION OF PREGNANCY (HHS-HCC): Primary | ICD-10-CM

## 2024-02-12 PROCEDURE — 0501F PRENATAL FLOW SHEET: CPT | Performed by: ADVANCED PRACTICE MIDWIFE

## 2024-02-12 RX ORDER — FAMOTIDINE 10 MG/1
10 TABLET ORAL DAILY
COMMUNITY
End: 2024-03-06 | Stop reason: ALTCHOICE

## 2024-02-12 NOTE — PROGRESS NOTES
Subjective     Cat Sarkar is a 28 y.o.  at 35w1d with a working estimated date of delivery of 3/17/2024, by Last Menstrual Period who presents for a routine prenatal visit. Endorses good fetal movement, denies vaginal bleeding, leakage of fluid, or regular contractions.    Tums no longer providing heartburn relief, started OTC Pepcid.    Her pregnancy is complicated by:  Pregnancy Problems (from 21 to present)       Problem Noted Resolved    Encounter for supervision of normal first pregnancy in second trimester 10/23/2023 by Tiffanie Sarmiento MD No    Priority:  Medium      Overview Addendum 2024  9:49 AM by Tiffanie Sarmiento MD     - s/p flu vaccine   - Recommended updated COVID vaccine   - s/p Tdap vaccine   - Risks-reducing NIPS  - It's a BOY   - normal fetal echo   - Has breast pump   - Discussed RSV vaccine -does not plan to get          Suspected fetal anomaly, antepartum 2023 by Chica Mendoza MD 2024 by Tiffanie Sarmiento MD             Objective   Physical Exam:   Weight: 101 kg (222 lb 6 oz)  TW.5 kg (36 lb 6 oz)  Expected Total Weight Gain: 7 kg (15 lb)-11.5 kg (25 lb)   Pregravid BMI: 26.69  BP: 122/68  Fetal Heart Rate: 128 Fundal Height (cm): 35 cm             Postpartum Depression: Not on file        Prenatal Labs  Lab Results   Component Value Date    HGB 11.5 (L) 2023    HCT 35.5 (L) 2023     2023    ABO B 2023    LABRH POS 2023    NEISSGONOAMP NEGATIVE 2023    CHLAMTRACAMP NEGATIVE 2023    SYPHT NONREACTIVE 2023    HEPBSAG NONREACTIVE 2023    HIV1X2 NONREACTIVE 2023    URINECULTURE NO GROWTH 2023     Lab Results   Component Value Date    GLUC1P 146 (H) 2023       Assessment/Plan   28 y.o.  at 35w1d  Continue prenatal vitamins  Advised of GBS culture next visit    Reviewed warning signs, fetal movement counts, and when to call provider    Follow up in 1 week(s) for a  routine prenatal visit or sooner as needed.    Nadine Thompson, [id number]

## 2024-02-21 ENCOUNTER — ROUTINE PRENATAL (OUTPATIENT)
Dept: OBSTETRICS AND GYNECOLOGY | Facility: CLINIC | Age: 29
End: 2024-02-21
Payer: COMMERCIAL

## 2024-02-21 VITALS — BODY MASS INDEX: 32.11 KG/M2 | WEIGHT: 223.8 LBS | DIASTOLIC BLOOD PRESSURE: 78 MMHG | SYSTOLIC BLOOD PRESSURE: 128 MMHG

## 2024-02-21 DIAGNOSIS — Z34.03 ENCOUNTER FOR SUPERVISION OF NORMAL FIRST PREGNANCY IN THIRD TRIMESTER (HHS-HCC): Primary | ICD-10-CM

## 2024-02-21 DIAGNOSIS — Z3A.36 36 WEEKS GESTATION OF PREGNANCY (HHS-HCC): ICD-10-CM

## 2024-02-21 PROCEDURE — 87081 CULTURE SCREEN ONLY: CPT

## 2024-02-21 PROCEDURE — 0501F PRENATAL FLOW SHEET: CPT | Performed by: OBSTETRICS & GYNECOLOGY

## 2024-02-21 NOTE — PROGRESS NOTES
GBS today    Matilde Blanco MA    Routine prenatal visit   Feeling OK.  Discussed kick counts. GBS done today. Cervix 1/50/-2.     Medical Problems       Problem List       Encounter for supervision of normal first pregnancy in second trimester    Overview Addendum 1/16/2024  9:49 AM by Tiffanie Sarmiento MD     - s/p flu vaccine   - Recommended updated COVID vaccine   - s/p Tdap vaccine   - Risks-reducing NIPS  - It's a BOY   - normal fetal echo   - Has breast pump   - Discussed RSV vaccine -does not plan to get          Palpitations    Overview Addendum 1/30/2024  1:22 PM by Tiffanie Sarmiento MD     - saw cardiology  - s/p Holter monitor              Follow up in 1 week(s).

## 2024-02-23 LAB — GP B STREP GENITAL QL CULT: NORMAL

## 2024-02-27 ENCOUNTER — ROUTINE PRENATAL (OUTPATIENT)
Dept: OBSTETRICS AND GYNECOLOGY | Facility: CLINIC | Age: 29
End: 2024-02-27
Payer: COMMERCIAL

## 2024-02-27 VITALS — WEIGHT: 224 LBS | SYSTOLIC BLOOD PRESSURE: 110 MMHG | DIASTOLIC BLOOD PRESSURE: 72 MMHG | BODY MASS INDEX: 32.14 KG/M2

## 2024-02-27 DIAGNOSIS — Z3A.37 37 WEEKS GESTATION OF PREGNANCY (HHS-HCC): ICD-10-CM

## 2024-02-27 DIAGNOSIS — Z34.02 ENCOUNTER FOR SUPERVISION OF NORMAL FIRST PREGNANCY IN SECOND TRIMESTER (HHS-HCC): Primary | ICD-10-CM

## 2024-02-27 PROCEDURE — 0501F PRENATAL FLOW SHEET: CPT | Performed by: OBSTETRICS & GYNECOLOGY

## 2024-02-27 NOTE — PROGRESS NOTES
GBS negative.    Matilde Blanco MA II    Routine prenatal visit   Doing OK.  Had a headache this morning which resolved with Tylenol. Normal BP today.  Call parameters reviewed.  Discussed GBS negative status.  Labor precautions reviewed.     Medical Problems       Problem List       Encounter for supervision of normal first pregnancy in second trimester    Overview Addendum 1/16/2024  9:49 AM by Tiffanie Sarmiento MD     - s/p flu vaccine   - Recommended updated COVID vaccine   - s/p Tdap vaccine   - Risks-reducing NIPS  - It's a BOY   - normal fetal echo   - Has breast pump   - Discussed RSV vaccine -does not plan to get          Palpitations    Overview Addendum 1/30/2024  1:22 PM by Tiffanie Sarmiento MD     - saw cardiology  - s/p Holter monitor              Follow up in 1 week(s).

## 2024-02-28 ENCOUNTER — TELEPHONE (OUTPATIENT)
Dept: OBSTETRICS AND GYNECOLOGY | Facility: CLINIC | Age: 29
End: 2024-02-28

## 2024-02-28 NOTE — TELEPHONE ENCOUNTER
Patient called in wanting someone to reach out to them regarding a concern they were having. Patient stated they have been having a headache since Monday and nothing she has taken has been working for the pain.

## 2024-03-06 ENCOUNTER — ROUTINE PRENATAL (OUTPATIENT)
Dept: OBSTETRICS AND GYNECOLOGY | Facility: CLINIC | Age: 29
End: 2024-03-06
Payer: COMMERCIAL

## 2024-03-06 VITALS — WEIGHT: 225.8 LBS | DIASTOLIC BLOOD PRESSURE: 76 MMHG | SYSTOLIC BLOOD PRESSURE: 130 MMHG | BODY MASS INDEX: 32.4 KG/M2

## 2024-03-06 DIAGNOSIS — O36.8130 DECREASED FETAL MOVEMENTS IN THIRD TRIMESTER, SINGLE OR UNSPECIFIED FETUS (HHS-HCC): ICD-10-CM

## 2024-03-06 DIAGNOSIS — Z34.02 ENCOUNTER FOR SUPERVISION OF NORMAL FIRST PREGNANCY IN SECOND TRIMESTER (HHS-HCC): Primary | ICD-10-CM

## 2024-03-06 DIAGNOSIS — K21.9 GASTROESOPHAGEAL REFLUX DISEASE WITHOUT ESOPHAGITIS: ICD-10-CM

## 2024-03-06 DIAGNOSIS — Z3A.38 38 WEEKS GESTATION OF PREGNANCY (HHS-HCC): ICD-10-CM

## 2024-03-06 PROCEDURE — 0501F PRENATAL FLOW SHEET: CPT | Performed by: OBSTETRICS & GYNECOLOGY

## 2024-03-06 PROCEDURE — 59025 FETAL NON-STRESS TEST: CPT | Performed by: OBSTETRICS & GYNECOLOGY

## 2024-03-06 RX ORDER — PANTOPRAZOLE SODIUM 20 MG/1
20 TABLET, DELAYED RELEASE ORAL
Qty: 30 TABLET | Refills: 11 | Status: SHIPPED | OUTPATIENT
Start: 2024-03-06 | End: 2024-05-01 | Stop reason: WASHOUT

## 2024-03-06 NOTE — PROCEDURES
Cat Sarkar, a  at 38w3d with an IMANI of 3/17/2024, by Last Menstrual Period, was seen at [facility] for a nonstress test.    Non-Stress Test   Baseline Fetal Heart Rate for Non-Stress Test: 125 BPM  Variability in Waveform for Non-Stress Test: Moderate  Accelerations in Non-Stress Test: Yes  Decelerations in Non-Stress Test: None  Contractions in Non-Stress Test: Not present  Acoustic Stimulator for Non-Stress Test: No  Interpretation of Non-Stress Test   Interpretation of Non-Stress Test: Reactive

## 2024-03-06 NOTE — PROGRESS NOTES
Swelling in ankles, DFM the last 3 days.    Matilde Blanco MA II     Routine prenatal visit   Has noticed a decrease in fetal movement over the past 2-3 days.  Feeling movements during her visit today.  NST reactive.  Wants cervical exam today - 1.5/50/-3.     Medical Problems       Problem List       Encounter for supervision of normal first pregnancy in second trimester    Overview Addendum 1/16/2024  9:49 AM by Tiffanie Sarmiento MD     - s/p flu vaccine   - Recommended updated COVID vaccine   - s/p Tdap vaccine   - Risks-reducing NIPS  - It's a BOY   - normal fetal echo   - Has breast pump   - Discussed RSV vaccine -does not plan to get          Palpitations    Overview Addendum 1/30/2024  1:22 PM by Tiffanie Sarmiento MD     - saw cardiology  - s/p Holter monitor              Follow up in 1 week(s).

## 2024-03-08 ENCOUNTER — APPOINTMENT (OUTPATIENT)
Dept: OBSTETRICS AND GYNECOLOGY | Facility: CLINIC | Age: 29
End: 2024-03-08
Payer: COMMERCIAL

## 2024-03-11 ENCOUNTER — ROUTINE PRENATAL (OUTPATIENT)
Dept: OBSTETRICS AND GYNECOLOGY | Facility: CLINIC | Age: 29
End: 2024-03-11
Payer: COMMERCIAL

## 2024-03-11 ENCOUNTER — PREP FOR PROCEDURE (OUTPATIENT)
Dept: OBSTETRICS AND GYNECOLOGY | Facility: HOSPITAL | Age: 29
End: 2024-03-11

## 2024-03-11 VITALS — DIASTOLIC BLOOD PRESSURE: 64 MMHG | WEIGHT: 227.6 LBS | BODY MASS INDEX: 32.66 KG/M2 | SYSTOLIC BLOOD PRESSURE: 114 MMHG

## 2024-03-11 DIAGNOSIS — Z34.02 ENCOUNTER FOR SUPERVISION OF NORMAL FIRST PREGNANCY IN SECOND TRIMESTER (HHS-HCC): Primary | ICD-10-CM

## 2024-03-11 DIAGNOSIS — Z3A.39 39 WEEKS GESTATION OF PREGNANCY (HHS-HCC): ICD-10-CM

## 2024-03-11 DIAGNOSIS — Z34.03 ENCOUNTER FOR SUPERVISION OF NORMAL FIRST PREGNANCY IN THIRD TRIMESTER (HHS-HCC): Primary | ICD-10-CM

## 2024-03-11 PROCEDURE — 0501F PRENATAL FLOW SHEET: CPT | Performed by: OBSTETRICS & GYNECOLOGY

## 2024-03-11 NOTE — PROGRESS NOTES
Cervix check    Matilde Blanco MA II    Routine prenatal visit   No issues today but wants to discuss scheduling IOL around EDC.  Not feeling contractions.  IOL scheduled at 8pm on 3/17.  Labor precautions reviewed.     Medical Problems       Problem List       Encounter for supervision of normal first pregnancy in second trimester    Overview Addendum 1/16/2024  9:49 AM by Tiffanie Sarmiento MD     - s/p flu vaccine   - Recommended updated COVID vaccine   - s/p Tdap vaccine   - Risks-reducing NIPS  - It's a BOY   - normal fetal echo   - Has breast pump   - Discussed RSV vaccine -does not plan to get          Palpitations    Overview Addendum 1/30/2024  1:22 PM by Tiffanie Sarmiento MD     - saw cardiology  - s/p Holter monitor              Follow up in 1 week(s).

## 2024-03-14 ENCOUNTER — APPOINTMENT (OUTPATIENT)
Dept: OBSTETRICS AND GYNECOLOGY | Facility: CLINIC | Age: 29
End: 2024-03-14
Payer: COMMERCIAL

## 2024-03-17 ENCOUNTER — ANESTHESIA (OUTPATIENT)
Dept: OBSTETRICS AND GYNECOLOGY | Facility: HOSPITAL | Age: 29
End: 2024-03-17
Payer: COMMERCIAL

## 2024-03-17 ENCOUNTER — APPOINTMENT (OUTPATIENT)
Dept: OBSTETRICS AND GYNECOLOGY | Facility: HOSPITAL | Age: 29
End: 2024-03-17
Payer: COMMERCIAL

## 2024-03-17 ENCOUNTER — ANESTHESIA EVENT (OUTPATIENT)
Dept: OBSTETRICS AND GYNECOLOGY | Facility: HOSPITAL | Age: 29
End: 2024-03-17
Payer: COMMERCIAL

## 2024-03-17 ENCOUNTER — HOSPITAL ENCOUNTER (INPATIENT)
Facility: HOSPITAL | Age: 29
LOS: 3 days | Discharge: HOME | End: 2024-03-20
Attending: OBSTETRICS & GYNECOLOGY | Admitting: OBSTETRICS & GYNECOLOGY
Payer: COMMERCIAL

## 2024-03-17 DIAGNOSIS — Z34.02 ENCOUNTER FOR SUPERVISION OF NORMAL FIRST PREGNANCY IN SECOND TRIMESTER (HHS-HCC): ICD-10-CM

## 2024-03-17 DIAGNOSIS — R52 POSTPARTUM PAIN (HHS-HCC): Primary | ICD-10-CM

## 2024-03-17 PROBLEM — K21.9 GASTROESOPHAGEAL REFLUX DISEASE: Status: ACTIVE | Noted: 2024-03-17

## 2024-03-17 PROBLEM — Z3A.40 40 WEEKS GESTATION OF PREGNANCY (HHS-HCC): Status: ACTIVE | Noted: 2024-03-17

## 2024-03-17 LAB
ABO GROUP (TYPE) IN BLOOD: NORMAL
ANTIBODY SCREEN: NORMAL
ERYTHROCYTE [DISTWIDTH] IN BLOOD BY AUTOMATED COUNT: 12.9 % (ref 11.5–14.5)
HCT VFR BLD AUTO: 34.9 % (ref 36–46)
HGB BLD-MCNC: 11.6 G/DL (ref 12–16)
MCH RBC QN AUTO: 31.4 PG (ref 26–34)
MCHC RBC AUTO-ENTMCNC: 33.2 G/DL (ref 32–36)
MCV RBC AUTO: 95 FL (ref 80–100)
NRBC BLD-RTO: 0 /100 WBCS (ref 0–0)
PLATELET # BLD AUTO: 293 X10*3/UL (ref 150–450)
RBC # BLD AUTO: 3.69 X10*6/UL (ref 4–5.2)
RH FACTOR (ANTIGEN D): NORMAL
WBC # BLD AUTO: 14.7 X10*3/UL (ref 4.4–11.3)

## 2024-03-17 PROCEDURE — 85027 COMPLETE CBC AUTOMATED: CPT | Performed by: OBSTETRICS & GYNECOLOGY

## 2024-03-17 PROCEDURE — 1120000001 HC OB PRIVATE ROOM DAILY

## 2024-03-17 PROCEDURE — 86901 BLOOD TYPING SEROLOGIC RH(D): CPT | Performed by: OBSTETRICS & GYNECOLOGY

## 2024-03-17 PROCEDURE — 2500000001 HC RX 250 WO HCPCS SELF ADMINISTERED DRUGS (ALT 637 FOR MEDICARE OP): Performed by: OBSTETRICS & GYNECOLOGY

## 2024-03-17 PROCEDURE — 7210000002 HC LABOR PER HOUR

## 2024-03-17 PROCEDURE — 36415 COLL VENOUS BLD VENIPUNCTURE: CPT | Performed by: OBSTETRICS & GYNECOLOGY

## 2024-03-17 PROCEDURE — 2500000001 HC RX 250 WO HCPCS SELF ADMINISTERED DRUGS (ALT 637 FOR MEDICARE OP)

## 2024-03-17 PROCEDURE — 86780 TREPONEMA PALLIDUM: CPT | Mod: STJLAB | Performed by: OBSTETRICS & GYNECOLOGY

## 2024-03-17 RX ORDER — LIDOCAINE HYDROCHLORIDE 10 MG/ML
30 INJECTION INFILTRATION; PERINEURAL ONCE AS NEEDED
Status: DISCONTINUED | OUTPATIENT
Start: 2024-03-17 | End: 2024-03-18

## 2024-03-17 RX ORDER — METHYLERGONOVINE MALEATE 0.2 MG/ML
0.2 INJECTION INTRAVENOUS ONCE AS NEEDED
Status: DISCONTINUED | OUTPATIENT
Start: 2024-03-17 | End: 2024-03-18

## 2024-03-17 RX ORDER — ONDANSETRON HYDROCHLORIDE 2 MG/ML
4 INJECTION, SOLUTION INTRAVENOUS EVERY 6 HOURS PRN
Status: DISCONTINUED | OUTPATIENT
Start: 2024-03-17 | End: 2024-03-18

## 2024-03-17 RX ORDER — NIFEDIPINE 10 MG/1
10 CAPSULE ORAL ONCE AS NEEDED
Status: DISCONTINUED | OUTPATIENT
Start: 2024-03-17 | End: 2024-03-18

## 2024-03-17 RX ORDER — CARBOPROST TROMETHAMINE 250 UG/ML
250 INJECTION, SOLUTION INTRAMUSCULAR ONCE AS NEEDED
Status: DISCONTINUED | OUTPATIENT
Start: 2024-03-17 | End: 2024-03-18

## 2024-03-17 RX ORDER — MISOPROSTOL 200 UG/1
800 TABLET ORAL ONCE AS NEEDED
Status: DISCONTINUED | OUTPATIENT
Start: 2024-03-17 | End: 2024-03-18

## 2024-03-17 RX ORDER — OXYTOCIN/0.9 % SODIUM CHLORIDE 30/500 ML
60 PLASTIC BAG, INJECTION (ML) INTRAVENOUS ONCE AS NEEDED
Status: COMPLETED | OUTPATIENT
Start: 2024-03-17 | End: 2024-03-18

## 2024-03-17 RX ORDER — OXYTOCIN 10 [USP'U]/ML
10 INJECTION, SOLUTION INTRAMUSCULAR; INTRAVENOUS ONCE AS NEEDED
Status: DISCONTINUED | OUTPATIENT
Start: 2024-03-17 | End: 2024-03-18

## 2024-03-17 RX ORDER — ONDANSETRON 4 MG/1
4 TABLET, FILM COATED ORAL EVERY 6 HOURS PRN
Status: DISCONTINUED | OUTPATIENT
Start: 2024-03-17 | End: 2024-03-18

## 2024-03-17 RX ORDER — TERBUTALINE SULFATE 1 MG/ML
0.25 INJECTION SUBCUTANEOUS ONCE AS NEEDED
Status: DISCONTINUED | OUTPATIENT
Start: 2024-03-17 | End: 2024-03-18

## 2024-03-17 RX ORDER — SODIUM CHLORIDE, SODIUM LACTATE, POTASSIUM CHLORIDE, CALCIUM CHLORIDE 600; 310; 30; 20 MG/100ML; MG/100ML; MG/100ML; MG/100ML
125 INJECTION, SOLUTION INTRAVENOUS CONTINUOUS
Status: DISCONTINUED | OUTPATIENT
Start: 2024-03-17 | End: 2024-03-18

## 2024-03-17 RX ORDER — METOCLOPRAMIDE 10 MG/1
10 TABLET ORAL EVERY 6 HOURS PRN
Status: DISCONTINUED | OUTPATIENT
Start: 2024-03-17 | End: 2024-03-18

## 2024-03-17 RX ORDER — LABETALOL HYDROCHLORIDE 5 MG/ML
20 INJECTION, SOLUTION INTRAVENOUS ONCE AS NEEDED
Status: DISCONTINUED | OUTPATIENT
Start: 2024-03-17 | End: 2024-03-18

## 2024-03-17 RX ORDER — HYDRALAZINE HYDROCHLORIDE 20 MG/ML
5 INJECTION INTRAMUSCULAR; INTRAVENOUS ONCE AS NEEDED
Status: DISCONTINUED | OUTPATIENT
Start: 2024-03-17 | End: 2024-03-18

## 2024-03-17 RX ORDER — TRANEXAMIC ACID 100 MG/ML
1000 INJECTION, SOLUTION INTRAVENOUS ONCE AS NEEDED
Status: DISCONTINUED | OUTPATIENT
Start: 2024-03-17 | End: 2024-03-18

## 2024-03-17 RX ORDER — LOPERAMIDE HYDROCHLORIDE 2 MG/1
4 CAPSULE ORAL EVERY 2 HOUR PRN
Status: DISCONTINUED | OUTPATIENT
Start: 2024-03-17 | End: 2024-03-18

## 2024-03-17 RX ORDER — METOCLOPRAMIDE HYDROCHLORIDE 5 MG/ML
10 INJECTION INTRAMUSCULAR; INTRAVENOUS EVERY 6 HOURS PRN
Status: DISCONTINUED | OUTPATIENT
Start: 2024-03-17 | End: 2024-03-18

## 2024-03-17 RX ADMIN — MISOPROSTOL 25 MCG: 100 TABLET ORAL at 21:10

## 2024-03-17 RX ADMIN — MISOPROSTOL 25 MCG: 100 TABLET ORAL at 23:05

## 2024-03-17 SDOH — HEALTH STABILITY: MENTAL HEALTH: HOW OFTEN DO YOU HAVE A DRINK CONTAINING ALCOHOL?: NEVER

## 2024-03-17 SDOH — ECONOMIC STABILITY: FOOD INSECURITY: WITHIN THE PAST 12 MONTHS, THE FOOD YOU BOUGHT JUST DIDN'T LAST AND YOU DIDN'T HAVE MONEY TO GET MORE.: NEVER TRUE

## 2024-03-17 SDOH — SOCIAL STABILITY: SOCIAL INSECURITY: DOES ANYONE TRY TO KEEP YOU FROM HAVING/CONTACTING OTHER FRIENDS OR DOING THINGS OUTSIDE YOUR HOME?: NO

## 2024-03-17 SDOH — HEALTH STABILITY: MENTAL HEALTH: HAVE YOU USED ANY SUBSTANCES (CANABIS, COCAINE, HEROIN, HALLUCINOGENS, INHALANTS, ETC.) IN THE PAST 12 MONTHS?: NO

## 2024-03-17 SDOH — HEALTH STABILITY: MENTAL HEALTH: HOW MANY STANDARD DRINKS CONTAINING ALCOHOL DO YOU HAVE ON A TYPICAL DAY?: PATIENT DOES NOT DRINK

## 2024-03-17 SDOH — ECONOMIC STABILITY: FOOD INSECURITY: WITHIN THE PAST 12 MONTHS, YOU WORRIED THAT YOUR FOOD WOULD RUN OUT BEFORE YOU GOT MONEY TO BUY MORE.: NEVER TRUE

## 2024-03-17 SDOH — HEALTH STABILITY: MENTAL HEALTH
STRESS IS WHEN SOMEONE FEELS TENSE, NERVOUS, ANXIOUS, OR CAN'T SLEEP AT NIGHT BECAUSE THEIR MIND IS TROUBLED. HOW STRESSED ARE YOU?: NOT AT ALL

## 2024-03-17 SDOH — HEALTH STABILITY: MENTAL HEALTH: CURRENT SMOKER: 0

## 2024-03-17 SDOH — HEALTH STABILITY: MENTAL HEALTH: SUICIDAL BEHAVIOR (LIFETIME): NO

## 2024-03-17 SDOH — SOCIAL STABILITY: SOCIAL INSECURITY: ARE THERE ANY APPARENT SIGNS OF INJURIES/BEHAVIORS THAT COULD BE RELATED TO ABUSE/NEGLECT?: NO

## 2024-03-17 SDOH — HEALTH STABILITY: MENTAL HEALTH: WISH TO BE DEAD (PAST 1 MONTH): NO

## 2024-03-17 SDOH — SOCIAL STABILITY: SOCIAL INSECURITY: HAS ANYONE EVER THREATENED TO HURT YOUR FAMILY OR YOUR PETS?: NO

## 2024-03-17 SDOH — SOCIAL STABILITY: SOCIAL INSECURITY: ARE YOU OR HAVE YOU BEEN THREATENED OR ABUSED PHYSICALLY, EMOTIONALLY, OR SEXUALLY BY ANYONE?: NO

## 2024-03-17 SDOH — HEALTH STABILITY: MENTAL HEALTH: HAVE YOU USED ANY PRESCRIPTION DRUGS OTHER THAN PRESCRIBED IN THE PAST 12 MONTHS?: NO

## 2024-03-17 SDOH — HEALTH STABILITY: MENTAL HEALTH: NON-SPECIFIC ACTIVE SUICIDAL THOUGHTS (PAST 1 MONTH): NO

## 2024-03-17 SDOH — ECONOMIC STABILITY: HOUSING INSECURITY: DO YOU FEEL UNSAFE GOING BACK TO THE PLACE WHERE YOU ARE LIVING?: NO

## 2024-03-17 SDOH — SOCIAL STABILITY: SOCIAL INSECURITY: HAVE YOU HAD THOUGHTS OF HARMING ANYONE ELSE?: NO

## 2024-03-17 SDOH — SOCIAL STABILITY: SOCIAL INSECURITY: ABUSE SCREEN: ADULT

## 2024-03-17 SDOH — SOCIAL STABILITY: SOCIAL INSECURITY: PHYSICAL ABUSE: DENIES

## 2024-03-17 SDOH — SOCIAL STABILITY: SOCIAL INSECURITY: DO YOU FEEL ANYONE HAS EXPLOITED OR TAKEN ADVANTAGE OF YOU FINANCIALLY OR OF YOUR PERSONAL PROPERTY?: NO

## 2024-03-17 SDOH — HEALTH STABILITY: MENTAL HEALTH: WERE YOU ABLE TO COMPLETE ALL THE BEHAVIORAL HEALTH SCREENINGS?: YES

## 2024-03-17 SDOH — SOCIAL STABILITY: SOCIAL INSECURITY: VERBAL ABUSE: DENIES

## 2024-03-17 ASSESSMENT — PAIN SCALES - GENERAL
PAINLEVEL_OUTOF10: 0 - NO PAIN

## 2024-03-17 ASSESSMENT — ACTIVITIES OF DAILY LIVING (ADL)
BATHING: INDEPENDENT
HEARING - RIGHT EAR: FUNCTIONAL
FEEDING YOURSELF: INDEPENDENT
LACK_OF_TRANSPORTATION: NO
WALKS IN HOME: INDEPENDENT
ADEQUATE_TO_COMPLETE_ADL: YES
GROOMING: INDEPENDENT
TOILETING: INDEPENDENT
HEARING - LEFT EAR: FUNCTIONAL
JUDGMENT_ADEQUATE_SAFELY_COMPLETE_DAILY_ACTIVITIES: YES
PATIENT'S MEMORY ADEQUATE TO SAFELY COMPLETE DAILY ACTIVITIES?: YES
DRESSING YOURSELF: INDEPENDENT

## 2024-03-17 ASSESSMENT — LIFESTYLE VARIABLES
SKIP TO QUESTIONS 9-10: 1
AUDIT-C TOTAL SCORE: 0
HOW MANY STANDARD DRINKS CONTAINING ALCOHOL DO YOU HAVE ON A TYPICAL DAY: PATIENT DOES NOT DRINK
AUDIT-C TOTAL SCORE: 0
HOW OFTEN DO YOU HAVE A DRINK CONTAINING ALCOHOL: NEVER
HOW OFTEN DO YOU HAVE 6 OR MORE DRINKS ON ONE OCCASION: NEVER

## 2024-03-17 ASSESSMENT — PATIENT HEALTH QUESTIONNAIRE - PHQ9
2. FEELING DOWN, DEPRESSED OR HOPELESS: NOT AT ALL
SUM OF ALL RESPONSES TO PHQ9 QUESTIONS 1 & 2: 0
1. LITTLE INTEREST OR PLEASURE IN DOING THINGS: NOT AT ALL

## 2024-03-18 LAB — TREPONEMA PALLIDUM IGG+IGM AB [PRESENCE] IN SERUM OR PLASMA BY IMMUNOASSAY: NONREACTIVE

## 2024-03-18 PROCEDURE — 59050 FETAL MONITOR W/REPORT: CPT

## 2024-03-18 PROCEDURE — 2500000004 HC RX 250 GENERAL PHARMACY W/ HCPCS (ALT 636 FOR OP/ED): Performed by: OBSTETRICS & GYNECOLOGY

## 2024-03-18 PROCEDURE — 7210000002 HC LABOR PER HOUR

## 2024-03-18 PROCEDURE — 7100000016 HC LABOR RECOVERY PER HOUR

## 2024-03-18 PROCEDURE — 01967 NEURAXL LBR ANES VAG DLVR: CPT | Performed by: NURSE ANESTHETIST, CERTIFIED REGISTERED

## 2024-03-18 PROCEDURE — 2500000001 HC RX 250 WO HCPCS SELF ADMINISTERED DRUGS (ALT 637 FOR MEDICARE OP): Performed by: OBSTETRICS & GYNECOLOGY

## 2024-03-18 PROCEDURE — 2500000004 HC RX 250 GENERAL PHARMACY W/ HCPCS (ALT 636 FOR OP/ED): Performed by: NURSE ANESTHETIST, CERTIFIED REGISTERED

## 2024-03-18 PROCEDURE — 59400 OBSTETRICAL CARE: CPT | Performed by: OBSTETRICS & GYNECOLOGY

## 2024-03-18 PROCEDURE — 3E033VJ INTRODUCTION OF OTHER HORMONE INTO PERIPHERAL VEIN, PERCUTANEOUS APPROACH: ICD-10-PCS | Performed by: OBSTETRICS & GYNECOLOGY

## 2024-03-18 PROCEDURE — 0HQ9XZZ REPAIR PERINEUM SKIN, EXTERNAL APPROACH: ICD-10-PCS | Performed by: OBSTETRICS & GYNECOLOGY

## 2024-03-18 PROCEDURE — 59409 OBSTETRICAL CARE: CPT | Performed by: OBSTETRICS & GYNECOLOGY

## 2024-03-18 PROCEDURE — 1120000001 HC OB PRIVATE ROOM DAILY

## 2024-03-18 RX ORDER — FAMOTIDINE 20 MG/1
20 TABLET, FILM COATED ORAL 2 TIMES DAILY
Status: DISCONTINUED | OUTPATIENT
Start: 2024-03-18 | End: 2024-03-18

## 2024-03-18 RX ORDER — FENTANYL/ROPIVACAINE/NS/PF 2MCG/ML-.2
0-25 PLASTIC BAG, INJECTION (ML) INJECTION CONTINUOUS
Status: DISCONTINUED | OUTPATIENT
Start: 2024-03-18 | End: 2024-03-18

## 2024-03-18 RX ORDER — OXYTOCIN 10 [USP'U]/ML
10 INJECTION, SOLUTION INTRAMUSCULAR; INTRAVENOUS ONCE AS NEEDED
Status: DISCONTINUED | OUTPATIENT
Start: 2024-03-18 | End: 2024-03-20 | Stop reason: HOSPADM

## 2024-03-18 RX ORDER — LABETALOL HYDROCHLORIDE 5 MG/ML
20 INJECTION, SOLUTION INTRAVENOUS ONCE AS NEEDED
Status: DISCONTINUED | OUTPATIENT
Start: 2024-03-18 | End: 2024-03-20 | Stop reason: HOSPADM

## 2024-03-18 RX ORDER — ONDANSETRON 4 MG/1
4 TABLET, FILM COATED ORAL EVERY 6 HOURS PRN
Status: DISCONTINUED | OUTPATIENT
Start: 2024-03-18 | End: 2024-03-20 | Stop reason: HOSPADM

## 2024-03-18 RX ORDER — ONDANSETRON HYDROCHLORIDE 2 MG/ML
4 INJECTION, SOLUTION INTRAVENOUS EVERY 6 HOURS PRN
Status: DISCONTINUED | OUTPATIENT
Start: 2024-03-18 | End: 2024-03-20 | Stop reason: HOSPADM

## 2024-03-18 RX ORDER — DOCUSATE SODIUM 100 MG/1
100 CAPSULE, LIQUID FILLED ORAL 2 TIMES DAILY
Status: DISCONTINUED | OUTPATIENT
Start: 2024-03-18 | End: 2024-03-20 | Stop reason: HOSPADM

## 2024-03-18 RX ORDER — ACETAMINOPHEN 325 MG/1
975 TABLET ORAL ONCE
Status: COMPLETED | OUTPATIENT
Start: 2024-03-18 | End: 2024-03-18

## 2024-03-18 RX ORDER — LOPERAMIDE HYDROCHLORIDE 2 MG/1
4 CAPSULE ORAL EVERY 2 HOUR PRN
Status: DISCONTINUED | OUTPATIENT
Start: 2024-03-18 | End: 2024-03-20 | Stop reason: HOSPADM

## 2024-03-18 RX ORDER — OXYTOCIN/0.9 % SODIUM CHLORIDE 30/500 ML
2-30 PLASTIC BAG, INJECTION (ML) INTRAVENOUS CONTINUOUS
Status: DISCONTINUED | OUTPATIENT
Start: 2024-03-18 | End: 2024-03-18

## 2024-03-18 RX ORDER — IBUPROFEN 600 MG/1
600 TABLET ORAL EVERY 6 HOURS
Status: DISCONTINUED | OUTPATIENT
Start: 2024-03-18 | End: 2024-03-20 | Stop reason: HOSPADM

## 2024-03-18 RX ORDER — ACETAMINOPHEN 325 MG/1
975 TABLET ORAL EVERY 6 HOURS
Status: DISCONTINUED | OUTPATIENT
Start: 2024-03-18 | End: 2024-03-20 | Stop reason: HOSPADM

## 2024-03-18 RX ORDER — DIPHENHYDRAMINE HCL 25 MG
25 TABLET ORAL EVERY 6 HOURS PRN
Status: DISCONTINUED | OUTPATIENT
Start: 2024-03-18 | End: 2024-03-20 | Stop reason: HOSPADM

## 2024-03-18 RX ORDER — ENOXAPARIN SODIUM 100 MG/ML
40 INJECTION SUBCUTANEOUS EVERY 24 HOURS
Status: DISCONTINUED | OUTPATIENT
Start: 2024-03-19 | End: 2024-03-20 | Stop reason: HOSPADM

## 2024-03-18 RX ORDER — METHYLERGONOVINE MALEATE 0.2 MG/ML
0.2 INJECTION INTRAVENOUS ONCE AS NEEDED
Status: DISCONTINUED | OUTPATIENT
Start: 2024-03-18 | End: 2024-03-20 | Stop reason: HOSPADM

## 2024-03-18 RX ORDER — HYDRALAZINE HYDROCHLORIDE 20 MG/ML
5 INJECTION INTRAMUSCULAR; INTRAVENOUS ONCE AS NEEDED
Status: DISCONTINUED | OUTPATIENT
Start: 2024-03-18 | End: 2024-03-20 | Stop reason: HOSPADM

## 2024-03-18 RX ORDER — ADHESIVE BANDAGE
10 BANDAGE TOPICAL
Status: DISCONTINUED | OUTPATIENT
Start: 2024-03-18 | End: 2024-03-20 | Stop reason: HOSPADM

## 2024-03-18 RX ORDER — CARBOPROST TROMETHAMINE 250 UG/ML
250 INJECTION, SOLUTION INTRAMUSCULAR ONCE AS NEEDED
Status: DISCONTINUED | OUTPATIENT
Start: 2024-03-18 | End: 2024-03-20 | Stop reason: HOSPADM

## 2024-03-18 RX ORDER — TRANEXAMIC ACID 100 MG/ML
1000 INJECTION, SOLUTION INTRAVENOUS ONCE AS NEEDED
Status: DISCONTINUED | OUTPATIENT
Start: 2024-03-18 | End: 2024-03-20 | Stop reason: HOSPADM

## 2024-03-18 RX ORDER — BISACODYL 10 MG/1
10 SUPPOSITORY RECTAL DAILY PRN
Status: DISCONTINUED | OUTPATIENT
Start: 2024-03-18 | End: 2024-03-20 | Stop reason: HOSPADM

## 2024-03-18 RX ORDER — OXYTOCIN/0.9 % SODIUM CHLORIDE 30/500 ML
60 PLASTIC BAG, INJECTION (ML) INTRAVENOUS ONCE AS NEEDED
Status: DISCONTINUED | OUTPATIENT
Start: 2024-03-18 | End: 2024-03-20 | Stop reason: HOSPADM

## 2024-03-18 RX ORDER — DIPHENHYDRAMINE HYDROCHLORIDE 50 MG/ML
25 INJECTION INTRAMUSCULAR; INTRAVENOUS EVERY 6 HOURS PRN
Status: DISCONTINUED | OUTPATIENT
Start: 2024-03-18 | End: 2024-03-20 | Stop reason: HOSPADM

## 2024-03-18 RX ORDER — MINERAL OIL
OIL (ML) ORAL
Status: DISPENSED
Start: 2024-03-18 | End: 2024-03-19

## 2024-03-18 RX ORDER — POLYETHYLENE GLYCOL 3350 17 G/17G
17 POWDER, FOR SOLUTION ORAL 2 TIMES DAILY PRN
Status: DISCONTINUED | OUTPATIENT
Start: 2024-03-18 | End: 2024-03-20 | Stop reason: HOSPADM

## 2024-03-18 RX ORDER — SIMETHICONE 80 MG
80 TABLET,CHEWABLE ORAL 4 TIMES DAILY PRN
Status: DISCONTINUED | OUTPATIENT
Start: 2024-03-18 | End: 2024-03-20 | Stop reason: HOSPADM

## 2024-03-18 RX ORDER — LIDOCAINE 560 MG/1
1 PATCH PERCUTANEOUS; TOPICAL; TRANSDERMAL
Status: DISCONTINUED | OUTPATIENT
Start: 2024-03-18 | End: 2024-03-20 | Stop reason: HOSPADM

## 2024-03-18 RX ORDER — MISOPROSTOL 200 UG/1
800 TABLET ORAL ONCE AS NEEDED
Status: DISCONTINUED | OUTPATIENT
Start: 2024-03-18 | End: 2024-03-20 | Stop reason: HOSPADM

## 2024-03-18 RX ORDER — FAMOTIDINE 20 MG/1
20 TABLET, FILM COATED ORAL 2 TIMES DAILY
Status: DISCONTINUED | OUTPATIENT
Start: 2024-03-18 | End: 2024-03-20 | Stop reason: HOSPADM

## 2024-03-18 RX ORDER — NIFEDIPINE 10 MG/1
10 CAPSULE ORAL ONCE AS NEEDED
Status: DISCONTINUED | OUTPATIENT
Start: 2024-03-18 | End: 2024-03-20 | Stop reason: HOSPADM

## 2024-03-18 RX ADMIN — ACETAMINOPHEN 975 MG: 325 TABLET ORAL at 17:10

## 2024-03-18 RX ADMIN — SODIUM CHLORIDE, POTASSIUM CHLORIDE, SODIUM LACTATE AND CALCIUM CHLORIDE 125 ML/HR: 600; 310; 30; 20 INJECTION, SOLUTION INTRAVENOUS at 04:45

## 2024-03-18 RX ADMIN — SODIUM CHLORIDE, POTASSIUM CHLORIDE, SODIUM LACTATE AND CALCIUM CHLORIDE 125 ML/HR: 600; 310; 30; 20 INJECTION, SOLUTION INTRAVENOUS at 08:22

## 2024-03-18 RX ADMIN — FAMOTIDINE 20 MG: 20 TABLET, FILM COATED ORAL at 22:51

## 2024-03-18 RX ADMIN — IBUPROFEN 600 MG: 600 TABLET, FILM COATED ORAL at 22:51

## 2024-03-18 RX ADMIN — SODIUM CHLORIDE, POTASSIUM CHLORIDE, SODIUM LACTATE AND CALCIUM CHLORIDE 500 ML: 600; 310; 30; 20 INJECTION, SOLUTION INTRAVENOUS at 09:06

## 2024-03-18 RX ADMIN — Medication 10 ML/HR: at 04:37

## 2024-03-18 RX ADMIN — MISOPROSTOL 25 MCG: 100 TABLET ORAL at 01:00

## 2024-03-18 RX ADMIN — ACETAMINOPHEN 975 MG: 325 TABLET ORAL at 11:10

## 2024-03-18 RX ADMIN — ACETAMINOPHEN 975 MG: 325 TABLET ORAL at 22:51

## 2024-03-18 RX ADMIN — SODIUM CHLORIDE, POTASSIUM CHLORIDE, SODIUM LACTATE AND CALCIUM CHLORIDE 1000 ML: 600; 310; 30; 20 INJECTION, SOLUTION INTRAVENOUS at 04:10

## 2024-03-18 RX ADMIN — Medication 60 MILLI-UNITS/MIN: at 15:03

## 2024-03-18 RX ADMIN — FAMOTIDINE 20 MG: 20 TABLET, FILM COATED ORAL at 05:57

## 2024-03-18 RX ADMIN — IBUPROFEN 600 MG: 600 TABLET, FILM COATED ORAL at 17:10

## 2024-03-18 RX ADMIN — Medication 2 MILLI-UNITS/MIN: at 07:57

## 2024-03-18 RX ADMIN — DOCUSATE SODIUM 100 MG: 100 CAPSULE, LIQUID FILLED ORAL at 22:51

## 2024-03-18 RX ADMIN — Medication 10 ML/HR: at 13:10

## 2024-03-18 RX ADMIN — SODIUM CHLORIDE, POTASSIUM CHLORIDE, SODIUM LACTATE AND CALCIUM CHLORIDE 125 ML/HR: 600; 310; 30; 20 INJECTION, SOLUTION INTRAVENOUS at 13:18

## 2024-03-18 ASSESSMENT — PAIN DESCRIPTION - LOCATION: LOCATION: PERINEUM

## 2024-03-18 ASSESSMENT — PAIN SCALES - GENERAL
PAINLEVEL_OUTOF10: 4
PAINLEVEL_OUTOF10: 0 - NO PAIN
PAINLEVEL_OUTOF10: 4
PAINLEVEL_OUTOF10: 0 - NO PAIN
PAINLEVEL_OUTOF10: 2
PAINLEVEL_OUTOF10: 0 - NO PAIN
PAINLEVEL_OUTOF10: 2
PAINLEVEL_OUTOF10: 6
PAINLEVEL_OUTOF10: 2
PAINLEVEL_OUTOF10: 8

## 2024-03-18 ASSESSMENT — PAIN - FUNCTIONAL ASSESSMENT
PAIN_FUNCTIONAL_ASSESSMENT: 0-10
PAIN_FUNCTIONAL_ASSESSMENT: 0-10

## 2024-03-18 NOTE — ANESTHESIA PROCEDURE NOTES
Epidural Block    Patient location during procedure: OB  Start time: 3/18/2024 4:13 AM  Reason for block: labor analgesia  Staffing  Performed: KEEGAN   Authorized by: SAVITA Vieira    Performed by: SAVITA Vieira    Preanesthetic Checklist  Completed: patient identified, IV checked, risks and benefits discussed, surgical consent, monitors and equipment checked, pre-op evaluation, timeout performed and sterile techniques followed  Block Timeout  RN/Licensed healthcare professional reads aloud to the Anesthesia provider and entire team: Patient identity, procedure with side and site, patient position, and as applicable the availability of implants/special equipment/special requirements.  Patient on coagulant treatment: no  Timeout performed at: 3/18/2024 4:15 AM  Block Placement  Patient position: sitting  Prep: DuraPrep  Sterility prep: hand, mask, cap, gloves and drape  Sedation level: no sedation  Patient monitoring: blood pressure, continuous pulse oximetry and heart rate  Approach: midline  Local numbing: lidocaine 1% to skin and subcutaneous tissues  Vertebral space: lumbar  Lumbar location: L2-L3  Epidural  Loss of resistance technique: saline  Guidance: landmark technique        Needle  Needle type: Tuohy   Needle gauge: 17  Needle length: 10.2 cm  Needle insertion depth: 4.5 cm  Catheter type: end hole  Catheter size: 19 G  Catheter at skin depth: 9 cm  Catheter securement method: clear occlusive dressing    Test dose: lidocaine 1.5% with epinephrine 1-to-200,000  Test dose: lidocaine 1.5% with epinephrine 1-to-200,000  Test dose result: no positive test dose    PCEA  Medication concentration used: 0.2% Ropivacaine with 2 mcg/mL Fentanyl  Dose (mL): 5  Lockout (minutes): 20  1-Hour Limit (boluses/hr): 3  Basal Rate: 10        Assessment  Block outcome: pain improved  Number of attempts: 1  Events: no positive test dose and negative heme/CSF  Procedure assessment: patient tolerated  procedure well with no immediate complications  Additional Notes  Labor pain prior to epidural placement 10/10    When placing the epidural needle patient felt paresthesia right buttocks, adjusted needle slight left and the paresthesia resolved.

## 2024-03-18 NOTE — L&D DELIVERY NOTE
OB Delivery Note  3/18/2024  Cat Sarkar  28 y.o.   Vaginal, Spontaneous        Gestational Age: 40w1d  /Para:   Quantitative Blood Loss: Admission to Discharge: 410 mL (3/17/2024  7:56 PM - 3/18/2024  5:56 PM)    Clementine Sarkar [[id number]]      Labor Events    Sac identifier: Sac 1  Rupture date/time: 3/17/2024 2100  Rupture type: Spontaneous  Fluid color: Clear  Fluid odor: None  Labor type: Induced Onset of Labor  Labor allowed to proceed with plans for an attempted vaginal birth?: Yes  Induction: Oxytocin  Induction indications: Risk Reducing  Complications: None       Labor Event Times    Labor onset date/time: 3/18/2024 2040  Dilation complete date/time: 3/18/2024 1355  Start pushing date/time: 3/18/2024 135       Labor Length    2nd stage: 1h 02m  3rd stage: 0h 05m       Placenta    Placenta delivery date/time: 3/18/2024 1502  Placenta removal: Spontaneous  Placenta appearance: Intact  Placenta disposition: discarded       Cord    Vessels: 3 vessels  Complications: Nuchal  Nuchal intervention: reduced  Number of loops: 2  Delayed cord clamping?: Yes  Gases sent?: No       Lacerations    Episiotomy: None  Perineal laceration: None  Labial laceration?: Yes  Labial laceration location: left  Labial laceration repaired?: Yes  Vaginal laceration?: Yes  Vaginal laceration repaired?: Yes  Repair suture: 3-0 Synthetic Suture, 4-0 Synthetic Suture       Anesthesia    Method: Epidural       Operative Delivery    Forceps attempted?: No  Vacuum extractor attempted?: No       Shoulder Dystocia    Shoulder dystocia present?: No        Delivery    Time head delivered: 3/18/2024 14:57:00  Birth date/time: 3/18/2024 14:57:00  Delivery type: Vaginal, Spontaneous  Complications: None       Resuscitation    Method: Tactile stimulation       Apgars    Living status: Living  Apgar Component Scores:  1 min.:  5 min.:  10 min.:  15 min.:  20 min.:    Skin color:  0  1       Heart rate:  2  2       Reflex  irritability:  2  2       Muscle tone:  2  2       Respiratory effort:  2  2       Total:  8  9       Apgars assigned by: WILLIAMS NGUYEN RN       Delivery Providers    Delivering clinician: Tiffanie Sarmiento MD   Provider Role    Zarina Bose RN Delivery Nurse    Fabiola Kirkpatrick RN Nursery Nurse     Resident               Pt pushed for approx 1 hour.  This resulted in delivery of the vertex over an intact perineum.  A double nuchal cord was easily reduced. The anterior and posterior shoulders delivered without difficulty followed by the remainder of the infant.  The placenta delivered spontaneously and was intact on inspection.  A left labial laceration was repaired with interrupted sutures of 4.0 vicryl.  A posterior vaginal laceration was repaired with a running, interlocking suture of 3.0 vicryl. The perineum was intact.     Tiffanie Sarmiento MD

## 2024-03-18 NOTE — PROGRESS NOTES
S: Patient feeling rectal pressure with contractions.     O:  Vitals:    24 1332   BP:    Pulse: 102   Resp:    Temp:    SpO2: 99%     FHT: Baseline 140, moderate variability, accelerations present, no decelerations.  Category I tracing.    Contractions: q 1.5-2.5 min     Cervix: 9/100/0    A/P: 28 y.o.  at 40.1 admitted for IOL.     - Labor: Continue pitocin.  Anticipate second stage soon.   - Pain management: Comfortable with epidural overall other than increasing pressure.   - GBS: negative

## 2024-03-18 NOTE — CARE PLAN
The patient's goals for the shift include safe induction    The clinical goals for the shift include have a healthy baby      Problem: Vaginal Birth or  Section  Goal: Fetal and maternal status remain reassuring during the birth process  Outcome: Progressing  Goal: Demonstrates labor coping techniques through delivery  Outcome: Progressing     Problem: Safety - Adult  Goal: Free from fall injury  Outcome: Progressing

## 2024-03-18 NOTE — PROGRESS NOTES
Pt with epidural . Ctxs q 5 min. Agrees to pit augmentation. Last vag exam was 4/50/-2 prior to epidural. Not rechecked at this time.    Paris Mejia MD

## 2024-03-18 NOTE — ANESTHESIA PREPROCEDURE EVALUATION
Patient: Cat Sarkar    Evaluation Method: In-person visit    Procedure Information    Date: 24  Procedure: Labor Analgesia        40w0d 28 y.o. IOL. Heart palpitations during pregnancy and seen by cardiology.    Cardiac Service Results:  23 ECHO  1. Left ventricular systolic function is normal with a 55-60% estimated ejection fraction.      HOLTER MONITOR:  Sinus rhythm with sinus tachycardia with sinus arrhythmia  Max  (ONE OCCURRENCE OF VTACH WITH THE LONGEST EPISODE 4 BEATS)  MIN HR 55  AVERAGE HR 82  SVE <1%     /84   Pulse 87   LMP 2023       Relevant Problems   GI   (+) Gastroesophageal reflux disease     History of sciatic issues and a bulging disk about 4-6 years ago.  No issues since then.       Clinical information reviewed:   Tobacco  Allergies  Meds   Med Hx  Surg Hx   Fam Hx  Soc Hx        NPO Detail: Per L&D protocol  No data recorded     OB/Gyn Evaluation    Present Pregnancy    Patient is pregnant now.   Obstetric History                Physical Exam    Airway  Mallampati: II  TM distance: >3 FB  Neck ROM: full     Cardiovascular   Rhythm: regular  Rate: normal     Dental    Pulmonary   Breath sounds clear to auscultation     Abdominal     Comments: gravid           Anesthesia Plan    History of general anesthesia?: no  History of complications of general anesthesia?: unknown/emergency    ASA 2     epidural   (Patient has no history of anesthesia  Patient has no family history of problems with anesthesia     I informed and discussed the risks and benefits of general, spinal and epidural anesthesia with the patient.  The patient expressed her understanding and her questions were answered.  A verbal consent was given by the patient.  )  The patient is not a current smoker.    Anesthetic plan and risks discussed with patient.  Use of blood products discussed with patient who consented to blood products.

## 2024-03-18 NOTE — PROGRESS NOTES
S: Patient comfortable with her epidural.  Feeling some pressure intermittently with contractions, but does not have any pain.    O:  Vitals:    24 0942   BP:    Pulse: 99   Resp:    Temp:    SpO2: 97%     FHT: Baseline 150, moderate variability, accelerations present, occasional variable decelerations- not recurrent.  Category II tracing.  Contractions: irregular, q 1.5-4 min     Cervix: 5/90/-1    A/P: 28 y.o.  at 40.1 admitted for an IOL.     - Labor: Continue pitocin.  Titrate as able.   - Pain management: Comfortable with epidural   - GBS: negative

## 2024-03-18 NOTE — CARE PLAN
The patient's goals for the shift include safe induction    The clinical goals for the shift include healthy mom/ healthy baby     this shift. Since delivery the patient ambulated to the bathroom and voided x1. Patient has attempted to breastfeed and has been seen by lactation. The patient is currently sitting up in the chair and has been eating and is currently stable.    Zarina Bose RN

## 2024-03-18 NOTE — H&P
Obstetrical Admission History and Physical     Cat Sarkar is a 28 y.o.  at 40w0d. IMANI: 3/17/2024, by Last Menstrual Period. Estimated fetal weight: 7.5 lb. She has had prenatal care with Dr Sarmiento .    Chief Complaint: No chief complaint on file.    Assessment/Plan    Labor induction at 40 weeks with SROM on exam and Oral Cytotec dosing for labor induction.  Normal pregnancy .      Principal Problem:    40 weeks gestation of pregnancy      Pregnancy Problems (from 23 to present)       Problem Noted Resolved    40 weeks gestation of pregnancy 3/17/2024 by Paris Mejia MD No    Priority:  Medium      Encounter for supervision of normal first pregnancy in second trimester 10/23/2023 by Tiffanie Sarmiento MD No    Priority:  Medium      Overview Addendum 2024  9:49 AM by Tiffanie Sarmiento MD     - s/p flu vaccine   - Recommended updated COVID vaccine   - s/p Tdap vaccine   - Risks-reducing NIPS  - It's a BOY   - normal fetal echo   - Has breast pump   - Discussed RSV vaccine -does not plan to get          Suspected fetal anomaly, antepartum 2023 by Chica Mendoza MD 2024 by Tiffanie Sarmiento MD          Options for delivery have been discussed with the patient and she elects for an induction of labor.  Cervical ripening with cytotec, cervidil, other prostaglandin agents has been discussed.  Induction of labor with pitocin, amniotomy, cytotec, and cervical balloon have been discussed in detail. The risks, benefits, complications, alternatives, expected outcomes, potential problems during recuperation and recovery, and the risks of not performing the procedure were discussed with the patient. The patient stated understanding that the risks of delivery include, but are not limited to: death; reaction to medications; injury to bowel, bladder, ureters, uterus, cervix, vagina, and other pelvic and abdominal structures, infection; blood loss and possible need for transfusion;  and potential need for surgery, including hysterectomy. The risks of injury to the infant during delivery were also discussed. All questions were answered. There was concurrence with the planned procedure, and the patient wanted to proceed.    Admit to inpatient status. I anticipate that this patient will require a stay exceeding at least 2 midnights for delivery and postpartum.  Induction of labor.  Management of pregnancy complications, as indicated.    Subjective   Good fetal movement. Denies vaginal bleeding., Denies contractions., Denies leaking of fluid.       Reason for Induction of Labor:  40 weeks        Obstetrical History   OB History    Para Term  AB Living   1             SAB IAB Ectopic Multiple Live Births                  # Outcome Date GA Lbr Dank/2nd Weight Sex Delivery Anes PTL Lv   1 Current                Past Medical History  Past Medical History:   Diagnosis Date    Abnormal Pap smear of cervix         Past Surgical History   Past Surgical History:   Procedure Laterality Date    OTHER SURGICAL HISTORY  2022    No history of surgery       Social History  Social History     Tobacco Use    Smoking status: Former     Types: Cigarettes    Smokeless tobacco: Not on file   Substance Use Topics    Alcohol use: Not Currently     Substance and Sexual Activity   Drug Use Never       Allergies  Patient has no known allergies.     Medications  Medications Prior to Admission   Medication Sig Dispense Refill Last Dose    pantoprazole (Protonix) 20 mg EC tablet Take 1 tablet (20 mg) by mouth once daily in the morning. Take before meals. Do not crush, chew, or split. 30 tablet 11     prenatal no115/iron/folic acid (PRENATAL 19 ORAL) Take 1 tablet by mouth once daily.          Objective    Last Vitals  Temp Pulse Resp BP MAP O2 Sat     87   125/84         Physical Examination  GENERAL: Examination reveals a well developed, well nourished, gravid female in no acute distress. She is alert and  cooperative.  ABDOMEN: soft, gravid, nontender, nondistended, no abnormal masses, no epigastric pain  FHR is  , with  , and a   tracing.    [place] reading:    The fetus is in a vertex presentation, determined by vaginal exam  Current Estimated Fetal Weight 7.5 lb  established by Leopold's maneuver  CERVIX:  1-2  cm dilated, 50   % effaced, -3   station; MEMBRANES are  clear SROM   EXTREMITIES: no redness or tenderness in the calves or thighs, no edema  SKIN: normal coloration and turgor, no rashes       Lab Review  Labs in chart were reviewed.

## 2024-03-19 PROCEDURE — 2500000001 HC RX 250 WO HCPCS SELF ADMINISTERED DRUGS (ALT 637 FOR MEDICARE OP): Performed by: OBSTETRICS & GYNECOLOGY

## 2024-03-19 PROCEDURE — 1120000001 HC OB PRIVATE ROOM DAILY

## 2024-03-19 RX ADMIN — IBUPROFEN 600 MG: 600 TABLET, FILM COATED ORAL at 22:07

## 2024-03-19 RX ADMIN — IBUPROFEN 600 MG: 600 TABLET, FILM COATED ORAL at 12:33

## 2024-03-19 RX ADMIN — ACETAMINOPHEN 975 MG: 325 TABLET ORAL at 04:54

## 2024-03-19 RX ADMIN — FAMOTIDINE 20 MG: 20 TABLET, FILM COATED ORAL at 12:34

## 2024-03-19 RX ADMIN — FAMOTIDINE 20 MG: 20 TABLET, FILM COATED ORAL at 22:08

## 2024-03-19 RX ADMIN — DOCUSATE SODIUM 100 MG: 100 CAPSULE, LIQUID FILLED ORAL at 22:07

## 2024-03-19 RX ADMIN — IBUPROFEN 600 MG: 600 TABLET, FILM COATED ORAL at 04:54

## 2024-03-19 RX ADMIN — ACETAMINOPHEN 975 MG: 325 TABLET ORAL at 22:07

## 2024-03-19 RX ADMIN — ACETAMINOPHEN 975 MG: 325 TABLET ORAL at 12:32

## 2024-03-19 RX ADMIN — DOCUSATE SODIUM 100 MG: 100 CAPSULE, LIQUID FILLED ORAL at 12:34

## 2024-03-19 ASSESSMENT — PAIN SCALES - GENERAL
PAINLEVEL_OUTOF10: 0 - NO PAIN
PAINLEVEL_OUTOF10: 3
PAINLEVEL_OUTOF10: 2
PAINLEVEL_OUTOF10: 4
PAINLEVEL_OUTOF10: 2

## 2024-03-19 ASSESSMENT — PAIN - FUNCTIONAL ASSESSMENT: PAIN_FUNCTIONAL_ASSESSMENT: 0-10

## 2024-03-19 ASSESSMENT — PAIN DESCRIPTION - LOCATION: LOCATION: PERINEUM

## 2024-03-19 ASSESSMENT — PAIN DESCRIPTION - DESCRIPTORS: DESCRIPTORS: DISCOMFORT;PRESSURE

## 2024-03-19 NOTE — LACTATION NOTE
Lactation Consultant Note         Recommendations/Summary  RN in room at this time to review Feeding. Pediatrician requesting as parents are requesting discharge.Mother deign well with Nipple shield, denies pain. Pumping every 3hours - 1.5ml colostrum. FOB and Mother supplementing NB with Similac up to 30ml. Discussed increasing as NB days of life increase and weight. Given hand outs yesterday , reviewed. Mother had breastfeeding resource sheet and will reach out to out patient LC in 1-2 days. NB has appt at [facility] on Thursday in [place]. Parents live in [place] and out patient LC assisted in making out pt LC connection to make appointment. Parents agreeable, given opportunity to ask questions. Answered to fully content. Parents extremely educated and able to demonstrate and very attentive to NB needs. LC feels ready for discharge with out pt LC follow up. Updated pediatrician.   HPI:  See prior note    PAST MEDICAL & SURGICAL HISTORY:  DTs (delirium tremens)  Substance abuse  Gastritis  EtOH dependence  Mixed hyperlipidemia  PUD (peptic ulcer disease)  No significant past surgical history      MEDICATIONS  (STANDING):  pantoprazole Infusion 8 mG/Hr (10 mL/Hr) IV Continuous <Continuous>    MEDICATIONS  (PRN):      Allergies    No Known Allergies    Intolerances        FAMILY HISTORY:  No pertinent family history in first degree relatives (Father, Mother)      REVIEW OF SYSTEMS:    CONSTITUTIONAL: No fever, weight loss, or fatigue  EYES: No eye pain, visual disturbances, or discharge  ENMT:  No difficulty hearing, tinnitus, vertigo; No sinus or throat pain  NECK: No pain or stiffness  BREASTS: No pain, masses, or nipple discharge  RESPIRATORY: No cough, wheezing, chills or hemoptysis; No shortness of breath  CARDIOVASCULAR: No chest pain, palpitations, dizziness, or leg swelling  GASTROINTESTINAL: No abdominal or epigastric pain. No nausea, vomiting, or hematemesis; No diarrhea or constipation. No melena or hematochezia.  GENITOURINARY: No dysuria, frequency, hematuria, or incontinence  NEUROLOGICAL: No headaches, memory loss, loss of strength, numbness, or tremors  SKIN: No itching, burning, rashes, or lesions   LYMPH NODES: No enlarged glands  ENDOCRINE: No heat or cold intolerance; No hair loss  MUSCULOSKELETAL: No joint pain or swelling; No muscle, back, or extremity pain  PSYCHIATRIC: No depression, anxiety, mood swings, or difficulty sleeping  HEME/LYMPH: No easy bruising, or bleeding gums  ALLERGY AND IMMUNOLOGIC: No hives or eczema          SOCIAL HISTORY:    FAMILY HISTORY:  No pertinent family history in first degree relatives (Father, Mother)      Vital Signs Last 24 Hrs  T(C): 36.7 (10 Sep 2020 17:23), Max: 37.2 (10 Sep 2020 14:40)  T(F): 98.1 (10 Sep 2020 17:23), Max: 99 (10 Sep 2020 14:40)  HR: 102 (10 Sep 2020 17:23) (102 - 135)  BP: 138/92 (10 Sep 2020 17:23) (138/92 - 146/89)  BP(mean): --  RR: 19 (10 Sep 2020 17:23) (18 - 19)  SpO2: 96% (10 Sep 2020 17:23) (95% - 97%)    PHYSICAL EXAM:    GENERAL: NAD, well-groomed, well-developed  HEAD:  Atraumatic, Normocephalic  EYES: EOMI, PERRLA, conjunctiva and sclera clear  ENMT: No tonsillar erythema, exudates, or enlargement; Moist mucous membranes, Good dentition, No lesions  NECK: Supple, No JVD, Normal thyroid  NERVOUS SYSTEM:  Alert & Oriented X3, Good concentration; Motor Strength 5/5 B/L upper and lower extremities; DTRs 2+ intact and symmetric  CHEST/LUNG: Clear to percussion bilaterally; No rales, rhonchi, wheezing, or rubs  HEART: Regular rate and rhythm; No murmurs, rubs, or gallops  ABDOMEN: Soft, Nontender, Nondistended; Bowel sounds present  EXTREMITIES:  2+ Peripheral Pulses, No clubbing, cyanosis, or edema  LYMPH: No lymphadenopathy noted   RECTAL: No masses, prostate normal size and smooth, Guaiaci negative   BREAST: No palpable masses, skin no lesions no retractions, no discharges. adnexal no palpable masses noted   GYN: uterus normal size, adnexal, no palpable masses, no CMT, no uterine discharge   SKIN: No rashes or lesions    LABS:                        14.1   9.77  )-----------( 288      ( 10 Sep 2020 15:31 )             42.1       CBC:  09-10 @ 15:31  WBC  9.77  HGB 14.1  HCT 42.1 Plate 288  MCV 84.2           10 Sep 2020 18:24    140    |  105    |  16     ----------------------------<  89     4.3     |  17     |  1.08   10 Sep 2020 15:31    137    |  93     |  19     ----------------------------<  101    3.6     |  19     |  1.41     Ca    7.5        10 Sep 2020 18:24  Ca    9.0        10 Sep 2020 15:31  Mg     1.5       10 Sep 2020 18:24    TPro  9.5    /  Alb  4.4    /  TBili  0.7    /  DBili  x      /  AST  51     /  ALT  32     /  AlkPhos  60     10 Sep 2020 15:31    PT/INR - ( 10 Sep 2020 15:31 )   PT: 11.6 sec;   INR: 1.00 ratio         PTT - ( 10 Sep 2020 15:31 )  PTT:26.5 sec        RADIOLOGY & ADDITIONAL STUDIES: HPI:    · Chief Complaint: The patient is a 51y Male complaining of abdominal pain.	  · HPI Objective Statement: 51 years old male by ems c/o epigastric and mid abd pain vomiting coffee ground since yesterday and c/o feeling hot all over his body. Pt sts he drinks alcohol three to four times a week and last drink was 4 days ago. Pt denies headache, dizziness, blurred visions, light sensitivities, focal/distal weakness or numbness, cough, sob, chest pain, dysuria, or irregular bowel movements.	  ----------------- As Above ---------------------- Seen earlier today  Patient is a poor historian. Patient states that he has been having coffee ground emesis over the past 3 - 4 days. No blood but very dark. ETOH abuse (+) Upper abdominal pain. No melena.  Denies NSAIDs.   Patient has been admitted for the same reason multiple times over he past few years. Last EGD was 5/ 2020- gastritis.       PAST MEDICAL & SURGICAL HISTORY:  DTs (delirium tremens)  Substance abuse  Gastritis  EtOH dependence  Mixed hyperlipidemia  PUD (peptic ulcer disease)  No significant past surgical history      MEDICATIONS  (STANDING):  pantoprazole Infusion 8 mG/Hr (10 mL/Hr) IV Continuous <Continuous>    MEDICATIONS  (PRN):      Allergies    No Known Allergies    Intolerances        FAMILY HISTORY:  No pertinent family history in first degree relatives (Father, Mother)      REVIEW OF SYSTEMS:    CONSTITUTIONAL: No fever, weight loss,   EYES: No eye pain, visual disturbances, or discharge  ENMT:  No difficulty hearing, tinnitus, vertigo; No sinus or throat pain  NECK: No pain or stiffness  BREASTS: No pain, masses, or nipple discharge  RESPIRATORY: No cough, wheezing, chills or hemoptysis; No shortness of breath  CARDIOVASCULAR: No chest pain, palpitations, dizziness, or leg swelling  GASTROINTESTINAL: See above  GENITOURINARY: No dysuria, frequency, hematuria, or incontinence  NEUROLOGICAL: No headaches, memory loss, loss of strength, numbness, or tremors  SKIN: No itching, burning, rashes, or lesions   LYMPH NODES: No enlarged glands  ENDOCRINE: No heat or cold intolerance; No hair loss  MUSCULOSKELETAL: No joint pain or swelling; No muscle, back, or extremity pain  PSYCHIATRIC: No depression, anxiety, mood swings, or difficulty sleeping  HEME/LYMPH: No easy bruising, or bleeding gums  ALLERGY AND IMMUNOLOGIC: No hives or eczema          SOCIAL HISTORY:    FAMILY HISTORY:  No pertinent family history in first degree relatives (Father, Mother)      Vital Signs Last 24 Hrs  T(C): 36.7 (10 Sep 2020 17:23), Max: 37.2 (10 Sep 2020 14:40)  T(F): 98.1 (10 Sep 2020 17:23), Max: 99 (10 Sep 2020 14:40)  HR: 102 (10 Sep 2020 17:23) (102 - 135)  BP: 138/92 (10 Sep 2020 17:23) (138/92 - 146/89)  BP(mean): --  RR: 19 (10 Sep 2020 17:23) (18 - 19)  SpO2: 96% (10 Sep 2020 17:23) (95% - 97%)    PHYSICAL EXAM:    GENERAL: NAD, well-groomed, well-developed  HEAD:  Atraumatic, Normocephalic  EYES: EOMI, PERRLA, conjunctiva and sclera clear  ENMT: No tonsillar erythema, exudates, or enlargement; Moist mucous membranes, Good dentition, No lesions  NECK: Supple, No JVD, Normal thyroid  NERVOUS SYSTEM:  Alert & Oriented X3, Good concentration; Motor Strength 5/5 B/L upper and lower extremities;   CHEST/LUNG: Clear to percussion bilaterally; No rales, rhonchi, wheezing, or rubs  HEART: Regular rate and rhythm; No murmurs, rubs, or gallops  ABDOMEN: Soft, epigastric tenderness, Nondistended; Bowel sounds present  EXTREMITIES:  2+ Peripheral Pulses, No clubbing, cyanosis, or edema  LYMPH: No lymphadenopathy noted   RECTAL:  Patient refused   SKIN: No rashes or lesions    LABS:                        14.1   9.77  )-----------( 288      ( 10 Sep 2020 15:31 )             42.1       CBC:  09-10 @ 15:31  WBC  9.77  HGB 14.1  HCT 42.1 Plate 288  MCV 84.2           10 Sep 2020 18:24    140    |  105    |  16     ----------------------------<  89     4.3     |  17     |  1.08   10 Sep 2020 15:31    137    |  93     |  19     ----------------------------<  101    3.6     |  19     |  1.41     Ca    7.5        10 Sep 2020 18:24  Ca    9.0        10 Sep 2020 15:31  Mg     1.5       10 Sep 2020 18:24    TPro  9.5    /  Alb  4.4    /  TBili  0.7    /  DBili  x      /  AST  51     /  ALT  32     /  AlkPhos  60     10 Sep 2020 15:31    PT/INR - ( 10 Sep 2020 15:31 )   PT: 11.6 sec;   INR: 1.00 ratio         PTT - ( 10 Sep 2020 15:31 )  PTT:26.5 sec        RADIOLOGY & ADDITIONAL STUDIES: HPI:    · Chief Complaint: The patient is a 51y Male complaining of abdominal pain.	  · HPI Objective Statement: 51 years old male by ems c/o epigastric and mid abd pain vomiting coffee ground since yesterday and c/o feeling hot all over his body. Pt sts he drinks alcohol three to four times a week and last drink was 4 days ago. Pt denies headache, dizziness, blurred visions, light sensitivities, focal/distal weakness or numbness, cough, sob, chest pain, dysuria, or irregular bowel movements.	  ----------------- As Above ---------------------- Seen earlier today  Patient is a poor historian. Patient states that he has been having coffee ground emesis over the past 3 - 4 days. No blood but very dark. ETOH abuse (+) Upper abdominal pain. No melena.  Denies NSAIDs.   Patient has been admitted for the same reason multiple times over he past few years. Last EGD was 5/ 2020- gastritis.   See labs  / CT scan      PAST MEDICAL & SURGICAL HISTORY:  DTs (delirium tremens)  Substance abuse  Gastritis  EtOH dependence  Mixed hyperlipidemia  PUD (peptic ulcer disease)  No significant past surgical history      MEDICATIONS  (STANDING):  pantoprazole Infusion 8 mG/Hr (10 mL/Hr) IV Continuous <Continuous>    MEDICATIONS  (PRN):      Allergies    No Known Allergies    Intolerances        FAMILY HISTORY:  No pertinent family history in first degree relatives (Father, Mother)      REVIEW OF SYSTEMS:    CONSTITUTIONAL: No fever, weight loss,   EYES: No eye pain, visual disturbances, or discharge  ENMT:  No difficulty hearing, tinnitus, vertigo; No sinus or throat pain  NECK: No pain or stiffness  BREASTS: No pain, masses, or nipple discharge  RESPIRATORY: No cough, wheezing, chills or hemoptysis; No shortness of breath  CARDIOVASCULAR: No chest pain, palpitations, dizziness, or leg swelling  GASTROINTESTINAL: See above  GENITOURINARY: No dysuria, frequency, hematuria, or incontinence  NEUROLOGICAL: No headaches, memory loss, loss of strength, numbness, or tremors  SKIN: No itching, burning, rashes, or lesions   LYMPH NODES: No enlarged glands  ENDOCRINE: No heat or cold intolerance; No hair loss  MUSCULOSKELETAL: No joint pain or swelling; No muscle, back, or extremity pain  PSYCHIATRIC: No depression, anxiety, mood swings, or difficulty sleeping  HEME/LYMPH: No easy bruising, or bleeding gums  ALLERGY AND IMMUNOLOGIC: No hives or eczema          SOCIAL HISTORY:    FAMILY HISTORY:  No pertinent family history in first degree relatives (Father, Mother)      Vital Signs Last 24 Hrs  T(C): 36.7 (10 Sep 2020 17:23), Max: 37.2 (10 Sep 2020 14:40)  T(F): 98.1 (10 Sep 2020 17:23), Max: 99 (10 Sep 2020 14:40)  HR: 102 (10 Sep 2020 17:23) (102 - 135)  BP: 138/92 (10 Sep 2020 17:23) (138/92 - 146/89)  BP(mean): --  RR: 19 (10 Sep 2020 17:23) (18 - 19)  SpO2: 96% (10 Sep 2020 17:23) (95% - 97%)    PHYSICAL EXAM:    GENERAL: NAD, well-groomed, well-developed  HEAD:  Atraumatic, Normocephalic  EYES: EOMI, PERRLA, conjunctiva and sclera clear  ENMT: No tonsillar erythema, exudates, or enlargement; Moist mucous membranes, Good dentition, No lesions  NECK: Supple, No JVD, Normal thyroid  NERVOUS SYSTEM:  Alert & Oriented X3, Good concentration; Motor Strength 5/5 B/L upper and lower extremities;   CHEST/LUNG: Clear to percussion bilaterally; No rales, rhonchi, wheezing, or rubs  HEART: Regular rate and rhythm; No murmurs, rubs, or gallops  ABDOMEN: Soft, epigastric tenderness, Nondistended; Bowel sounds present  EXTREMITIES:  2+ Peripheral Pulses, No clubbing, cyanosis, or edema  LYMPH: No lymphadenopathy noted   RECTAL:  Patient refused   SKIN: No rashes or lesions    LABS:                        14.1   9.77  )-----------( 288      ( 10 Sep 2020 15:31 )             42.1       CBC:  09-10 @ 15:31  WBC  9.77  HGB 14.1  HCT 42.1 Plate 288  MCV 84.2           10 Sep 2020 18:24    140    |  105    |  16     ----------------------------<  89     4.3     |  17     |  1.08   10 Sep 2020 15:31    137    |  93     |  19     ----------------------------<  101    3.6     |  19     |  1.41     Ca    7.5        10 Sep 2020 18:24  Ca    9.0        10 Sep 2020 15:31  Mg     1.5       10 Sep 2020 18:24    TPro  9.5    /  Alb  4.4    /  TBili  0.7    /  DBili  x      /  AST  51     /  ALT  32     /  AlkPhos  60     10 Sep 2020 15:31    PT/INR - ( 10 Sep 2020 15:31 )   PT: 11.6 sec;   INR: 1.00 ratio         PTT - ( 10 Sep 2020 15:31 )  PTT:26.5 sec        RADIOLOGY & ADDITIONAL STUDIES:

## 2024-03-19 NOTE — LACTATION NOTE
"Lactation Consultant Note  Lactation Consultation       Maternal Information       Maternal Assessment       Infant Assessment       Feeding Assessment       LATCH TOOL       Breast Pump       Other OB Lactation Tools       Patient Follow-up       Other OB Lactation Documentation       Recommendations/Summary  See Flowsheet. RN in room multiple times during 12hour shift.  Mother   on 3/18/24 at 1457 of viable boy \"Peter\" at 39.5weeks gestation. Mother B+ NB had CBC sent at delivery wnl per pediatrician at 1630. BW 3530 7-13.  [id number] Rn in room to assist with feeiding. NB attempting to latch but coming off. Mother nipples are flat. Mother states has always been flat do not become erect. RN assisted with hand pump, and nipple rolling. Able to express colostrum but nipple will not stay erect. RN measured mother for 19mm nipple and used 20mm Nipple shield. Nb latch effectively on both sides - leaving colostrum and saliva in end of niopple shield. Mother able to demonstrate HE with RN.   Parents feeding goal is open , would like to try breastfeeding but would like to just make sure nb is \"fed\" open to formula.  2330: RN in room with parents. Requesting to use formula via paced bottle overnight. Mother just HE. Reviewed bottle feeding and timing with parents as well as paced bottle feeding and amounts per feed. Mother states she will attempt to pump at next feed while dad uses bottle. RN provided hospital grade electric pump, pump kit and supplies. Discussed feeding plan, pumping and supply and demand. Parents provided hand outs and taking notes of their own.    "

## 2024-03-19 NOTE — PROGRESS NOTES
"Postpartum Progress Note  Cat Sarkar is a 28 y.o. female  who delivered at 40w1d of gestation and is PPD #1 s/p .     Subjective    Patient doing well. Pain well-controlled with current regimen. Minimal cramping and moderate lochia. Patient has been unable to breast feed, has been bottlefeeding breast milk and formula. States that baby is doing well. Mood \"good\". Tolerating PO intake, denies nausea/vomiting. Ambulating and voiding without issue. Denies dizziness/lightheadedness. No BM, but passing flatus.     Objective    /62   Pulse 81   Temp 36.4 °C (97.5 °F) (Oral)   Resp 16   Wt 105 kg   LMP 2023   SpO2 97%   Breastfeeding Yes   BMI 33.15 kg/m²      General: awake, alert, well-appearing  HEENT: clear sclera  Lungs: breathing comfortably on RA  Cardiac: warm and well-perfused  Abdomen: Soft, non-distended, non-tender on palpation  Fundus: firm below umbilicus.  Neuro: alert and oriented  Psychological: appropriate affect    Admission on 2024   Component Date Value    ABO TYPE 2024 B     Rh TYPE 2024 POS     ANTIBODY SCREEN 2024 NEG     Syphilis Total Ab 2024 Nonreactive     WBC 2024 14.7 (H)     nRBC 2024 0.0     RBC 2024 3.69 (L)     Hemoglobin 2024 11.6 (L)     Hematocrit 2024 34.9 (L)     MCV 2024 95     MCH 2024 31.4     MCHC 2024 33.2     RDW 2024 12.9     Platelets 2024 293         Assessment/Plan    This is a 28 y.o. female  who delivered at 40w1d of gestation and is postpartum day 1 s/p . Postpartum course uneventful. Patient has met all postpartum milestones and will be discharged home on PPD#2 with follow up in 4-6 weeks with OB provider.     Plan:  s/p    -Continue routine postpartum care  -Admission hgb 14.7 --> ; Continue to monitor for si/sx of anemia.   -Single isolated elevation in /65 during hospital course, not meeting criteria for gestational " HTN  -Lactation consultant PRN  -Rh positive  -Rubella immune  -Declines circumcision    DVT prophylaxis  -VTE risk score = 5, for PPx lovenox  -SCDs, ambulation    Dispo: anticipate discharge on PPD#2 if continues to meet milestones. Plan for follow up in 4-6 weeks for postpartum visit with OB provider.     Deborah Holcomb MS3    Agree with medical student documentation above with edits made within the text.     Carisa Buchanan MD

## 2024-03-19 NOTE — PROGRESS NOTES
Spiritual Care Visit    Clinical Encounter Type  Visited With: Patient and family together  Routine Visit: Introduction  Continue Visiting: No                                            Taxonomy  Intended Effects: Preserve dignity and respect, Helping someone feel comforted, Promote sense of peace, Sindhu affirmation  Methods: Offer spiritual/Sikhism support  Interventions: Share words of hope and inspiration    Patient shared this is her first baby.  Patient is active in her [employer] Mu-ism and her  was on the way to bless her baby.   gave her an Christianity prayer card and was a supportive presence.

## 2024-03-19 NOTE — ANESTHESIA POSTPROCEDURE EVALUATION
Patient: Cat Sarkar    Procedure Summary       Date: 03/18/24 Room / Location:     Anesthesia Start: 0413 Anesthesia Stop: 1457    Procedure: Labor Analgesia Diagnosis:     Scheduled Providers:  Responsible Provider: SAVITA Clark    Anesthesia Type: epidural ASA Status: 2            Anesthesia Type: epidural    BP (!) 88/51 (sleeping)  Pulse 81   Temp 36.4 °C (97.5 °F) (Oral)   Resp 16       Anesthesia Post Evaluation    Patient location during evaluation: bedside  Patient participation: complete - patient participated  Level of consciousness: awake and alert  Pain management: satisfactory to patient  Multimodal analgesia pain management approach  Airway patency: patent  Cardiovascular status: acceptable  Respiratory status: acceptable  Hydration status: acceptable  Postoperative Nausea and Vomiting: none  Comments: Epidural catheter removed by nursing. No redness, swelling, or drainage at puncture site.    Complete resolution of numbness. Patient is able to lift legs, bend at the knees, and ambulate.    Patient denies problems with urination.    Patient denies nausea, headache or severe back pain.         There were no known notable events for this encounter.

## 2024-03-20 PROCEDURE — 2500000001 HC RX 250 WO HCPCS SELF ADMINISTERED DRUGS (ALT 637 FOR MEDICARE OP): Performed by: OBSTETRICS & GYNECOLOGY

## 2024-03-20 RX ORDER — ACETAMINOPHEN 500 MG
1000 TABLET ORAL EVERY 6 HOURS PRN
COMMUNITY
Start: 2024-03-20 | End: 2024-05-01 | Stop reason: WASHOUT

## 2024-03-20 RX ORDER — IBUPROFEN 200 MG
600 TABLET ORAL EVERY 6 HOURS PRN
COMMUNITY
Start: 2024-03-20 | End: 2024-05-01 | Stop reason: WASHOUT

## 2024-03-20 RX ADMIN — IBUPROFEN 600 MG: 600 TABLET, FILM COATED ORAL at 04:03

## 2024-03-20 RX ADMIN — ACETAMINOPHEN 975 MG: 325 TABLET ORAL at 04:03

## 2024-03-20 ASSESSMENT — PAIN SCALES - GENERAL
PAINLEVEL_OUTOF10: 1
PAINLEVEL_OUTOF10: 0 - NO PAIN

## 2024-03-20 NOTE — DISCHARGE SUMMARY
Discharge Summary    Admission Date: 3/17/2024  Discharge Date: 3/20/2024     Discharge Diagnosis  40 weeks gestation of pregnancy    Hospital Course  Delivery Date: 3/18/2024  2:57 PM   Delivery type: Vaginal, Spontaneous    GA at delivery: 40w1d  Outcome: Living   Anesthesia during delivery: Epidural   Intrapartum complications: None   Feeding method: Breastfeeding Status: Yes     Procedures:    Contraception at discharge: none      Pertinent Physical Exam At Time of Discharge  General: Examination reveals a well developed, well nourished, female, in no acute distress. She is alert and cooperative.  Lungs: Normal effort.  Fundus: firm and below umbilicus.  Extremities: no redness or tenderness in the calves or thighs, no edema.  Psychological: awake and alert; oriented to person, place, and time.    Discharge Meds     Your medication list        START taking these medications        Instructions Last Dose Given Next Dose Due   acetaminophen 500 mg tablet  Commonly known as: Tylenol Extra Strength      Take 2 tablets (1,000 mg) by mouth every 6 hours if needed for mild pain (1 - 3).       ibuprofen 200 mg tablet  Commonly known as: Motrin IB      Take 3 tablets (600 mg) by mouth every 6 hours if needed for mild pain (1 - 3).              CONTINUE taking these medications        Instructions Last Dose Given Next Dose Due   pantoprazole 20 mg EC tablet  Commonly known as: Protonix      Take 1 tablet (20 mg) by mouth once daily in the morning. Take before meals. Do not crush, chew, or split.       PRENATAL 19 ORAL                     Where to Get Your Medications        You can get these medications from any pharmacy    You don't need a prescription for these medications  acetaminophen 500 mg tablet  ibuprofen 200 mg tablet          Complications Requiring Follow-Up  None    Test Results Pending At Discharge  Pending Labs       No current pending labs.            Outpatient Follow-Up  No future appointments.    I  spent 10 minutes in the professional and overall care of this patient.      Sim Delong MD

## 2024-03-20 NOTE — NURSING NOTE
.Verbal and written discharge instructions reviewed with mother, to include post birth warning signs, medication reconciliation, follow up appointments, and  care. Patient verbalized understanding of.   .Patient is being discharged in stable condition, infant strapped in car seat, HUGS tag removed skin intact and ID bands verified. Infant is being carried by mother on lap in wheelchair. No further questions at this time. Phone numbers provided to patient and when all future appointments need to be made. Patient has all belongings and discharge folder.

## 2024-03-20 NOTE — NURSING NOTE
In for discharge questions-teaching completed- care. Maternal care, and post birth warning signs-questions answered-pt anxious to leave

## 2024-03-20 NOTE — CARE PLAN
The patient's goals for the shift include breastfeed every 2-3 hrs    The clinical goals for the shift include discharge to home    Over the shift, the patient met goals

## 2024-03-21 ENCOUNTER — APPOINTMENT (OUTPATIENT)
Dept: OBSTETRICS AND GYNECOLOGY | Facility: CLINIC | Age: 29
End: 2024-03-21
Payer: COMMERCIAL

## 2024-03-21 ENCOUNTER — TELEPHONE (OUTPATIENT)
Dept: OBSTETRICS AND GYNECOLOGY | Facility: CLINIC | Age: 29
End: 2024-03-21

## 2024-03-21 NOTE — TELEPHONE ENCOUNTER
Pt LVM on OB gregoria line 3 days postpartum with concerns for swelling in ankles. Tried calling pt back, LVM. Will send [employer] message.

## 2024-03-26 ENCOUNTER — HOSPITAL ENCOUNTER (OUTPATIENT)
Facility: HOSPITAL | Age: 29
Discharge: HOME | End: 2024-03-27
Attending: OBSTETRICS & GYNECOLOGY | Admitting: OBSTETRICS & GYNECOLOGY
Payer: COMMERCIAL

## 2024-03-26 ENCOUNTER — TELEPHONE (OUTPATIENT)
Dept: OBSTETRICS AND GYNECOLOGY | Facility: CLINIC | Age: 29
End: 2024-03-26

## 2024-03-26 VITALS
TEMPERATURE: 98.2 F | HEART RATE: 59 BPM | BODY MASS INDEX: 33.15 KG/M2 | WEIGHT: 231.04 LBS | OXYGEN SATURATION: 97 % | DIASTOLIC BLOOD PRESSURE: 88 MMHG | SYSTOLIC BLOOD PRESSURE: 158 MMHG | RESPIRATION RATE: 16 BRPM

## 2024-03-26 LAB
ALBUMIN SERPL BCP-MCNC: 3.9 G/DL (ref 3.4–5)
ALP SERPL-CCNC: 97 U/L (ref 33–110)
ALT SERPL W P-5'-P-CCNC: 17 U/L (ref 7–45)
ANION GAP SERPL CALC-SCNC: 15 MMOL/L (ref 10–20)
AST SERPL W P-5'-P-CCNC: 16 U/L (ref 9–39)
BILIRUB SERPL-MCNC: 0.2 MG/DL (ref 0–1.2)
BUN SERPL-MCNC: 18 MG/DL (ref 6–23)
CALCIUM SERPL-MCNC: 9.2 MG/DL (ref 8.6–10.3)
CHLORIDE SERPL-SCNC: 107 MMOL/L (ref 98–107)
CO2 SERPL-SCNC: 24 MMOL/L (ref 21–32)
CREAT SERPL-MCNC: 0.81 MG/DL (ref 0.5–1.05)
EGFRCR SERPLBLD CKD-EPI 2021: >90 ML/MIN/1.73M*2
ERYTHROCYTE [DISTWIDTH] IN BLOOD BY AUTOMATED COUNT: 12.8 % (ref 11.5–14.5)
GLUCOSE SERPL-MCNC: 109 MG/DL (ref 74–99)
HCT VFR BLD AUTO: 32.9 % (ref 36–46)
HGB BLD-MCNC: 10.7 G/DL (ref 12–16)
LDH SERPL L TO P-CCNC: 226 U/L (ref 84–246)
MCH RBC QN AUTO: 31.3 PG (ref 26–34)
MCHC RBC AUTO-ENTMCNC: 32.5 G/DL (ref 32–36)
MCV RBC AUTO: 96 FL (ref 80–100)
NRBC BLD-RTO: 0 /100 WBCS (ref 0–0)
PLATELET # BLD AUTO: 395 X10*3/UL (ref 150–450)
POTASSIUM SERPL-SCNC: 3.8 MMOL/L (ref 3.5–5.3)
PROT SERPL-MCNC: 6.3 G/DL (ref 6.4–8.2)
RBC # BLD AUTO: 3.42 X10*6/UL (ref 4–5.2)
SODIUM SERPL-SCNC: 142 MMOL/L (ref 136–145)
URATE SERPL-MCNC: 9 MG/DL (ref 2.3–6.7)
WBC # BLD AUTO: 14.8 X10*3/UL (ref 4.4–11.3)

## 2024-03-26 PROCEDURE — 84550 ASSAY OF BLOOD/URIC ACID: CPT | Performed by: OBSTETRICS & GYNECOLOGY

## 2024-03-26 PROCEDURE — 36415 COLL VENOUS BLD VENIPUNCTURE: CPT | Performed by: OBSTETRICS & GYNECOLOGY

## 2024-03-26 PROCEDURE — 2500000004 HC RX 250 GENERAL PHARMACY W/ HCPCS (ALT 636 FOR OP/ED): Performed by: OBSTETRICS & GYNECOLOGY

## 2024-03-26 PROCEDURE — 80048 BASIC METABOLIC PNL TOTAL CA: CPT | Performed by: OBSTETRICS & GYNECOLOGY

## 2024-03-26 PROCEDURE — 83615 LACTATE (LD) (LDH) ENZYME: CPT | Performed by: OBSTETRICS & GYNECOLOGY

## 2024-03-26 PROCEDURE — 85027 COMPLETE CBC AUTOMATED: CPT | Performed by: OBSTETRICS & GYNECOLOGY

## 2024-03-26 PROCEDURE — 2500000002 HC RX 250 W HCPCS SELF ADMINISTERED DRUGS (ALT 637 FOR MEDICARE OP, ALT 636 FOR OP/ED): Performed by: OBSTETRICS & GYNECOLOGY

## 2024-03-26 PROCEDURE — 2500000004 HC RX 250 GENERAL PHARMACY W/ HCPCS (ALT 636 FOR OP/ED)

## 2024-03-26 PROCEDURE — 99223 1ST HOSP IP/OBS HIGH 75: CPT | Performed by: OBSTETRICS & GYNECOLOGY

## 2024-03-26 RX ORDER — LABETALOL HYDROCHLORIDE 5 MG/ML
20 INJECTION, SOLUTION INTRAVENOUS ONCE AS NEEDED
Status: COMPLETED | OUTPATIENT
Start: 2024-03-26 | End: 2024-03-26

## 2024-03-26 RX ORDER — HYDRALAZINE HYDROCHLORIDE 20 MG/ML
5 INJECTION INTRAMUSCULAR; INTRAVENOUS ONCE AS NEEDED
Status: DISCONTINUED | OUTPATIENT
Start: 2024-03-26 | End: 2024-03-27 | Stop reason: HOSPADM

## 2024-03-26 RX ORDER — LIDOCAINE HYDROCHLORIDE 10 MG/ML
0.5 INJECTION INFILTRATION; PERINEURAL ONCE AS NEEDED
Status: DISCONTINUED | OUTPATIENT
Start: 2024-03-26 | End: 2024-03-27 | Stop reason: HOSPADM

## 2024-03-26 RX ORDER — LABETALOL HYDROCHLORIDE 5 MG/ML
40 INJECTION, SOLUTION INTRAVENOUS ONCE
Status: COMPLETED | OUTPATIENT
Start: 2024-03-26 | End: 2024-03-26

## 2024-03-26 RX ORDER — METOCLOPRAMIDE HYDROCHLORIDE 5 MG/ML
10 INJECTION INTRAMUSCULAR; INTRAVENOUS ONCE
Status: COMPLETED | OUTPATIENT
Start: 2024-03-26 | End: 2024-03-26

## 2024-03-26 RX ORDER — DIPHENHYDRAMINE HYDROCHLORIDE 50 MG/ML
25 INJECTION INTRAMUSCULAR; INTRAVENOUS ONCE
Status: COMPLETED | OUTPATIENT
Start: 2024-03-26 | End: 2024-03-26

## 2024-03-26 RX ORDER — LABETALOL HYDROCHLORIDE 5 MG/ML
INJECTION, SOLUTION INTRAVENOUS
Status: COMPLETED
Start: 2024-03-26 | End: 2024-03-26

## 2024-03-26 RX ORDER — NIFEDIPINE 30 MG/1
30 TABLET, FILM COATED, EXTENDED RELEASE ORAL
Status: DISCONTINUED | OUTPATIENT
Start: 2024-03-26 | End: 2024-03-27

## 2024-03-26 RX ORDER — NIFEDIPINE 10 MG/1
10 CAPSULE ORAL ONCE AS NEEDED
Status: DISCONTINUED | OUTPATIENT
Start: 2024-03-26 | End: 2024-03-27 | Stop reason: HOSPADM

## 2024-03-26 RX ORDER — ONDANSETRON HYDROCHLORIDE 2 MG/ML
4 INJECTION, SOLUTION INTRAVENOUS EVERY 6 HOURS PRN
Status: DISCONTINUED | OUTPATIENT
Start: 2024-03-26 | End: 2024-03-27 | Stop reason: HOSPADM

## 2024-03-26 RX ORDER — ACETAMINOPHEN 325 MG/1
650 TABLET ORAL ONCE
Status: COMPLETED | OUTPATIENT
Start: 2024-03-26 | End: 2024-03-26

## 2024-03-26 RX ORDER — ONDANSETRON 4 MG/1
4 TABLET, FILM COATED ORAL EVERY 6 HOURS PRN
Status: DISCONTINUED | OUTPATIENT
Start: 2024-03-26 | End: 2024-03-27 | Stop reason: HOSPADM

## 2024-03-26 RX ADMIN — LABETALOL HYDROCHLORIDE 20 MG: 5 INJECTION, SOLUTION INTRAVENOUS at 21:15

## 2024-03-26 RX ADMIN — SODIUM CHLORIDE, POTASSIUM CHLORIDE, SODIUM LACTATE AND CALCIUM CHLORIDE 1000 ML: 600; 310; 30; 20 INJECTION, SOLUTION INTRAVENOUS at 17:46

## 2024-03-26 RX ADMIN — LABETALOL HYDROCHLORIDE 40 MG: 5 INJECTION, SOLUTION INTRAVENOUS at 21:49

## 2024-03-26 RX ADMIN — NIFEDIPINE 30 MG: 30 TABLET, FILM COATED, EXTENDED RELEASE ORAL at 18:50

## 2024-03-26 RX ADMIN — ACETAMINOPHEN 650 MG: 325 TABLET ORAL at 17:44

## 2024-03-26 RX ADMIN — DIPHENHYDRAMINE HYDROCHLORIDE 25 MG: 50 INJECTION, SOLUTION INTRAMUSCULAR; INTRAVENOUS at 17:44

## 2024-03-26 RX ADMIN — METOCLOPRAMIDE 10 MG: 5 INJECTION, SOLUTION INTRAMUSCULAR; INTRAVENOUS at 17:46

## 2024-03-26 SDOH — SOCIAL STABILITY: SOCIAL INSECURITY: HAVE YOU HAD THOUGHTS OF HARMING ANYONE ELSE?: YES

## 2024-03-26 SDOH — SOCIAL STABILITY: SOCIAL INSECURITY: ARE THERE ANY APPARENT SIGNS OF INJURIES/BEHAVIORS THAT COULD BE RELATED TO ABUSE/NEGLECT?: NO

## 2024-03-26 SDOH — SOCIAL STABILITY: SOCIAL INSECURITY: DOES ANYONE TRY TO KEEP YOU FROM HAVING/CONTACTING OTHER FRIENDS OR DOING THINGS OUTSIDE YOUR HOME?: NO

## 2024-03-26 SDOH — SOCIAL STABILITY: SOCIAL INSECURITY: HAS ANYONE EVER THREATENED TO HURT YOUR FAMILY OR YOUR PETS?: NO

## 2024-03-26 SDOH — SOCIAL STABILITY: SOCIAL INSECURITY: DO YOU FEEL UNSAFE GOING BACK TO THE PLACE WHERE YOU ARE LIVING?: NO

## 2024-03-26 SDOH — SOCIAL STABILITY: SOCIAL INSECURITY: ABUSE: ADULT

## 2024-03-26 SDOH — SOCIAL STABILITY: SOCIAL INSECURITY: ARE YOU OR HAVE YOU BEEN THREATENED OR ABUSED PHYSICALLY, EMOTIONALLY, OR SEXUALLY BY ANYONE?: NO

## 2024-03-26 SDOH — SOCIAL STABILITY: SOCIAL INSECURITY: DO YOU FEEL ANYONE HAS EXPLOITED OR TAKEN ADVANTAGE OF YOU FINANCIALLY OR OF YOUR PERSONAL PROPERTY?: NO

## 2024-03-26 ASSESSMENT — ACTIVITIES OF DAILY LIVING (ADL)
TOILETING: INDEPENDENT
FEEDING YOURSELF: INDEPENDENT
WALKS IN HOME: INDEPENDENT
ADEQUATE_TO_COMPLETE_ADL: YES
GROOMING: INDEPENDENT
HEARING - RIGHT EAR: FUNCTIONAL
DRESSING YOURSELF: INDEPENDENT
PATIENT'S MEMORY ADEQUATE TO SAFELY COMPLETE DAILY ACTIVITIES?: YES
JUDGMENT_ADEQUATE_SAFELY_COMPLETE_DAILY_ACTIVITIES: YES
HEARING - LEFT EAR: FUNCTIONAL
BATHING: INDEPENDENT

## 2024-03-26 ASSESSMENT — PATIENT HEALTH QUESTIONNAIRE - PHQ9
SUM OF ALL RESPONSES TO PHQ9 QUESTIONS 1 & 2: 0
2. FEELING DOWN, DEPRESSED OR HOPELESS: NOT AT ALL
1. LITTLE INTEREST OR PLEASURE IN DOING THINGS: NOT AT ALL

## 2024-03-26 ASSESSMENT — LIFESTYLE VARIABLES
HOW MANY STANDARD DRINKS CONTAINING ALCOHOL DO YOU HAVE ON A TYPICAL DAY: PATIENT DOES NOT DRINK
SUBSTANCE_ABUSE_PAST_12_MONTHS: NO
AUDIT-C TOTAL SCORE: 0
SKIP TO QUESTIONS 9-10: 1
HOW OFTEN DO YOU HAVE 6 OR MORE DRINKS ON ONE OCCASION: NEVER
PRESCIPTION_ABUSE_PAST_12_MONTHS: NO
AUDIT-C TOTAL SCORE: 0
HOW OFTEN DO YOU HAVE A DRINK CONTAINING ALCOHOL: NEVER

## 2024-03-26 ASSESSMENT — PAIN - FUNCTIONAL ASSESSMENT: PAIN_FUNCTIONAL_ASSESSMENT: 0-10

## 2024-03-26 ASSESSMENT — COLUMBIA-SUICIDE SEVERITY RATING SCALE - C-SSRS
6. HAVE YOU EVER DONE ANYTHING, STARTED TO DO ANYTHING, OR PREPARED TO DO ANYTHING TO END YOUR LIFE?: NO
2. HAVE YOU ACTUALLY HAD ANY THOUGHTS OF KILLING YOURSELF?: NO
1. IN THE PAST MONTH, HAVE YOU WISHED YOU WERE DEAD OR WISHED YOU COULD GO TO SLEEP AND NOT WAKE UP?: NO

## 2024-03-26 ASSESSMENT — PAIN SCALES - GENERAL
PAINLEVEL_OUTOF10: 3
PAINLEVEL_OUTOF10: 0 - NO PAIN

## 2024-03-26 ASSESSMENT — PAIN DESCRIPTION - LOCATION: LOCATION: HEAD

## 2024-03-26 NOTE — TELEPHONE ENCOUNTER
8 day pp c/o headache last 3 days won't go away with Tylenol/Motrin, BP's 150's/ upper 80's Would you like to see her in office for BP check/eval?    Per Dr Sarmiento, patient advised to go to hospital, ob triage visit unfortunately because she will need mag if her headache doesn't resolve.     Patient made aware, message sent to on call physician Dr Delong to make him aware

## 2024-03-27 VITALS
OXYGEN SATURATION: 97 % | SYSTOLIC BLOOD PRESSURE: 139 MMHG | BODY MASS INDEX: 30.6 KG/M2 | RESPIRATION RATE: 18 BRPM | TEMPERATURE: 98.4 F | WEIGHT: 213.29 LBS | DIASTOLIC BLOOD PRESSURE: 82 MMHG | HEART RATE: 64 BPM

## 2024-03-27 PROBLEM — Z3A.40 40 WEEKS GESTATION OF PREGNANCY (HHS-HCC): Status: RESOLVED | Noted: 2024-03-17 | Resolved: 2024-03-27

## 2024-03-27 PROCEDURE — 2500000002 HC RX 250 W HCPCS SELF ADMINISTERED DRUGS (ALT 637 FOR MEDICARE OP, ALT 636 FOR OP/ED): Performed by: OBSTETRICS & GYNECOLOGY

## 2024-03-27 PROCEDURE — 36415 COLL VENOUS BLD VENIPUNCTURE: CPT

## 2024-03-27 PROCEDURE — 99215 OFFICE O/P EST HI 40 MIN: CPT | Performed by: OBSTETRICS & GYNECOLOGY

## 2024-03-27 PROCEDURE — 99214 OFFICE O/P EST MOD 30 MIN: CPT

## 2024-03-27 RX ORDER — NIFEDIPINE 30 MG/1
30 TABLET, FILM COATED, EXTENDED RELEASE ORAL DAILY
Status: DISCONTINUED | OUTPATIENT
Start: 2024-03-28 | End: 2024-03-27 | Stop reason: HOSPADM

## 2024-03-27 RX ORDER — NIFEDIPINE 30 MG/1
30 TABLET, FILM COATED, EXTENDED RELEASE ORAL
Qty: 30 TABLET | Refills: 0 | Status: SHIPPED | OUTPATIENT
Start: 2024-03-28 | End: 2024-05-01 | Stop reason: ALTCHOICE

## 2024-03-27 RX ORDER — ACETAMINOPHEN 325 MG/1
975 TABLET ORAL ONCE
Status: COMPLETED | OUTPATIENT
Start: 2024-03-27 | End: 2024-03-27

## 2024-03-27 RX ADMIN — ACETAMINOPHEN 975 MG: 325 TABLET ORAL at 05:23

## 2024-03-27 RX ADMIN — NIFEDIPINE 30 MG: 30 TABLET, FILM COATED, EXTENDED RELEASE ORAL at 06:43

## 2024-03-27 ASSESSMENT — PAIN SCALES - GENERAL: PAINLEVEL_OUTOF10: 0 - NO PAIN

## 2024-03-27 NOTE — DISCHARGE SUMMARY
OB Discharge Summary    Admission Date: 3/26/2024  Discharge Date: 3/27/2024     Discharge Diagnosis  Problem List:  2024-03: Preeclampsia in postpartum period         Hospital Course  Delivery Date: 3/18/2024  2:57 PM   Delivery type: Vaginal, Spontaneous    GA at delivery: 40w1d  Outcome: Living   Anesthesia during delivery: Epidural   Intrapartum complications: None   Feeding method:       Procedures:  none           Last Vitals:  Blood pressure 134/74, pulse 70, temperature 36.9 °C (98.4 °F), temperature source Oral, resp. rate 16, weight 96.7 kg (213 lb 4.7 oz), last menstrual period 06/11/2023, SpO2 97 %, currently breastfeeding.     Pertinent Physical Exam At Time of Discharge  General: Examination reveals a well developed, well nourished, female, in no acute distress. She is alert and cooperative.  Abdomen: non-tender, fundus below umbilicus   Incision: healing well.  Extremities: no redness or tenderness in the calves or thighs, no edema.  Psychological: mood normal    Lab Results   Component Value Date    HGB 10.7 (L) 03/26/2024    HCT 32.9 (L) 03/26/2024       Discharge Meds     Your medication list        CONTINUE taking these medications        Instructions Last Dose Given Next Dose Due   acetaminophen 500 mg tablet  Commonly known as: Tylenol Extra Strength      Take 2 tablets (1,000 mg) by mouth every 6 hours if needed for mild pain (1 - 3).       ibuprofen 200 mg tablet  Commonly known as: Motrin IB      Take 3 tablets (600 mg) by mouth every 6 hours if needed for mild pain (1 - 3).       pantoprazole 20 mg EC tablet  Commonly known as: Protonix      Take 1 tablet (20 mg) by mouth once daily in the morning. Take before meals. Do not crush, chew, or split.       PRENATAL 19 ORAL                     Complications Requiring Follow-Up  None    Test Results Pending At Discharge  Pending Labs       No current pending labs.            Outpatient Follow-Up  Take home blood pressure twice a day and  record    Nifedipine 30 mg LX once every am. See Dr Mejia for office appt     Future Appointments   Date Time Provider Department Center   5/1/2024  3:20 PM Tiffanie Sarmiento MD [facility]         Paris Mejia MD

## 2024-03-27 NOTE — DISCHARGE SUMMARY
Discharge Summary    Admission Date: 3/26/2024  Discharge Date: 3/27/2024    Discharge Diagnosis  Postpartum pre-eclampsia without severe features    Hospital Course  Delivery Date: 3/18/2024  2:57 PM   Delivery type: Vaginal, Spontaneous    GA at delivery: 40w1d  Outcome: Living   Anesthesia during delivery: Epidural   Intrapartum complications: None   Feeding method: Breastfeeding     Procedures: none  Contraception at discharge: none    Cat Sarkar is a 28 y.o., , who delivered at 40w1d gestation and is now postpartum day 9 s/p spontaneous vaginal delivery. She presented with headache not relieved by Tylenol and elevated blood pressures. During hospital course, patient had two severe range pressures and received one dose of 20 mg IV labetalol at 21:00, bringing pressures down to 140-150s systolic. Patient remains hemodynamically stable at this time and continues to deny chest pain, vision changes, RUQ pain, palpitations, severe headache. Patient discharged home with Nifedipine 30 XL once daily and instructed to take BP twice daily. Follow up appointment is scheduled 24 with Dr. Mejia.    Pertinent Physical Exam At Time of Discharge  General: examination reveals a well developed, well nourished, female, in no acute distress. She is alert and cooperative.  HEENT: external ears normal. Nose normal, no erythema or discharge.  Neck: supple, no significant adenopathy  Lungs: breathing even and unlabored  Cardiovascular: warm and well perfused  Abdominal: soft, fundus firm, below umbilicus, non-tender  Extremities: no redness or tenderness in the calves or thighs, no edema.  Neurological: alert, oriented, normal speech, no focal findings or movement disorder noted.    Discharge Meds  Nifedipine 30 mg XL tablet once daily     Your medication list        CONTINUE taking these medications        Instructions Last Dose Given Next Dose Due   acetaminophen 500 mg tablet  Commonly known as: Tylenol Extra  Strength      Take 2 tablets (1,000 mg) by mouth every 6 hours if needed for mild pain (1 - 3).       ibuprofen 200 mg tablet  Commonly known as: Motrin IB      Take 3 tablets (600 mg) by mouth every 6 hours if needed for mild pain (1 - 3).       pantoprazole 20 mg EC tablet  Commonly known as: Protonix      Take 1 tablet (20 mg) by mouth once daily in the morning. Take before meals. Do not crush, chew, or split.       PRENATAL 19 ORAL                     Complications Requiring Follow-Up  None. Follow up scheduled in 5 days in clinic for BP check.    Test Results Pending At Discharge  Pending Labs       No current pending labs.            Outpatient Follow-Up  Future Appointments   Date Time Provider Department Center   4/1/2024  1:40 PM Paris Mejia MD [id number] [place]   5/1/2024  3:20 PM Tiffanie Sarmiento MD [facility]       Deborah Holcomb, MS3

## 2024-03-27 NOTE — PROGRESS NOTES
Discharge Summary    Assessment/Plan   Cat Sarkar is a 28 y.o., , who delivered at 40w1d gestation and is now postpartum day 9 s/p spontaneous vaginal delivery. She presented with headache not relieved by Tylenol and elevated blood pressures. Treated with prn Labetalol IV . BPs resolved. Her HA also resolved so no neurological sx therefore to Mag sulfate prophylaxis neeeded.  Patient remains hemodynamically stable at this time. All BPs nonsevere for 24 hrs     Postpartum pre-eclampsia without severe features - day 9 readmit    - AST, ALT, LDH, PLT wnl  - Nifedipine 30 XL daily   Discharge home.   - BP checks 2x a day. SX discussed to call for . Has reliable family help at home.   - Following up Monday with Dr. Mejia        Pregnancy Problems (from 23 to present)       Problem Noted Resolved    40 weeks gestation of pregnancy 3/17/2024 by Paris Mejia MD No    Encounter for supervision of normal first pregnancy in second trimester 10/23/2023 by Tiffanie Sarmiento MD No    Overview Addendum 2024  9:49 AM by Tiffanie Sarmiento MD     - s/p flu vaccine   - Recommended updated COVID vaccine   - s/p Tdap vaccine   - Risks-reducing NIPS  - It's a BOY   - normal fetal echo   - Has breast pump   - Discussed RSV vaccine -does not plan to get          Suspected fetal anomaly, antepartum 2023 by Chica Mendoza MD 2024 by Tiffanie Sarmiento MD            Subjective   Overnight, patient had two severe range pressures and received one dose of 20 mg IV labetalol at 21:00, bringing pressures down to 140-150s systolic. On interview this morning patient denies chest pain, palpitations, vision changes, SOB, or abdominal pain. Endorsing a dull headache this morning, 2/10 in severity. No acute concerns at this time.    Objective   Allergies:   Patient has no known allergies.         Last Vitals:  Temp Pulse Resp BP MAP Pulse Ox   36.8 °C (98.2 °F) 61 16 126/59   96 %     Vitals Min/Max  Last 24 Hours:  Temp  Min: 36.8 °C (98.2 °F)  Max: 36.9 °C (98.4 °F)  Pulse  Min: 53  Max: 91  Resp  Min: 16  Max: 18  BP  Min: 119/60  Max: 167/76    Intake/Output:   No intake or output data in the 24 hours ending 03/27/24 0812    Physical Exam:  General: examination reveals a well developed, well nourished, female, in no acute distress. She is alert and cooperative.  HEENT: external ears normal. Nose normal, no erythema or discharge.  Neck: supple, no significant adenopathy  Lungs: breathing even and unlabored  Cardiovascular: warm and well perfused  Abdominal: soft, fundus firm, below umbilicus, non-tender  Extremities: no redness or tenderness in the calves or thighs, no edema.  Neurological: alert, oriented, normal speech, no focal findings or movement disorder noted.    Lab Data:  Results for orders placed or performed during the hospital encounter of 03/26/24 (from the past 24 hour(s))   Comprehensive metabolic panel   Result Value Ref Range    Glucose 109 (H) 74 - 99 mg/dL    Sodium 142 136 - 145 mmol/L    Potassium 3.8 3.5 - 5.3 mmol/L    Chloride 107 98 - 107 mmol/L    Bicarbonate 24 21 - 32 mmol/L    Anion Gap 15 10 - 20 mmol/L    Urea Nitrogen 18 6 - 23 mg/dL    Creatinine 0.81 0.50 - 1.05 mg/dL    eGFR >90 >60 mL/min/1.73m*2    Calcium 9.2 8.6 - 10.3 mg/dL    Albumin 3.9 3.4 - 5.0 g/dL    Alkaline Phosphatase 97 33 - 110 U/L    Total Protein 6.3 (L) 6.4 - 8.2 g/dL    AST 16 9 - 39 U/L    Bilirubin, Total 0.2 0.0 - 1.2 mg/dL    ALT 17 7 - 45 U/L   Lactate Dehydrogenase   Result Value Ref Range     84 - 246 U/L   Uric Acid   Result Value Ref Range    Uric Acid 9.0 (H) 2.3 - 6.7 mg/dL   CBC   Result Value Ref Range    WBC 14.8 (H) 4.4 - 11.3 x10*3/uL    nRBC 0.0 0.0 - 0.0 /100 WBCs    RBC 3.42 (L) 4.00 - 5.20 x10*6/uL    Hemoglobin 10.7 (L) 12.0 - 16.0 g/dL    Hematocrit 32.9 (L) 36.0 - 46.0 %    MCV 96 80 - 100 fL    MCH 31.3 26.0 - 34.0 pg    MCHC 32.5 32.0 - 36.0 g/dL    RDW 12.8 11.5 - 14.5 %     Platelets 395 150 - 450 x10*3/uL      Deborah Holcomb, MS3    I agree with this note. This patient was seen and examined by me.     Paris Mejia MD'

## 2024-03-27 NOTE — NURSING NOTE
Pt and spouse given discharge instructions including BP warning signs and instructions on monitoring at home. Pt to follow up with Dr. Mejia on 4/1/24. Pt and spouse verbalized understanding

## 2024-03-27 NOTE — CARE PLAN
The patient's goals for the shift include  Bp to remain below severe range    The clinical goals for the shift include  be discharged home.     Over the shift, the patient made progress toward the following goals and is adequate for discharge home.

## 2024-03-27 NOTE — CARE PLAN
Problem: Postpartum  Goal: Minimal s/sx of HDP and BP<160/110  3/27/2024 0700 by Keyonna Collazo RN  Outcome: Progressing  3/27/2024 0700 by Keyonna Collazo, RN  Outcome: Progressing

## 2024-03-27 NOTE — H&P
Obstetrical Admission History and Physical     Cat Sarkar is a 28 y.o.  who delivered 3/18/2024  by Vaginal, Spontaneous  at 40w1d and is now PPD 8. Patient called with headache not relieved with Tylenol and elevated blood pressures. On L&D headache treated with Benadryl and Reglan but had several severe range pressures requiring IV medication. Will admit and monitor effectiveness of oral agents.    Chief Complaint: No chief complaint on file.    Assessment/Plan    Postpartum pre-eclampsia - headache resolved will not start Magnesium at this point. Oral Nifedipine.     Active Problems:  There are no active Hospital Problems.      Pregnancy Problems (from 23 to present)       Problem Noted Resolved    40 weeks gestation of pregnancy 3/17/2024 by Paris Mejia MD No    Priority:  Medium      Encounter for supervision of normal first pregnancy in second trimester 10/23/2023 by Tiffanie Sarmiento MD No    Priority:  Medium      Overview Addendum 2024  9:49 AM by Tiffaine Sarmiento MD     - s/p flu vaccine   - Recommended updated COVID vaccine   - s/p Tdap vaccine   - Risks-reducing NIPS  - It's a BOY   - normal fetal echo   - Has breast pump   - Discussed RSV vaccine -does not plan to get          Suspected fetal anomaly, antepartum 2023 by Chica Mendoza MD 2024 by Tiffanie Sarmiento MD          Sowmya Shelton is here complaining of  headache not relieved with Tylenol    The patient is being admitted for further evaluation and monitoring.          Obstetrical History   OB History    Para Term  AB Living   1 1 1     1   SAB IAB Ectopic Multiple Live Births         0 1      # Outcome Date GA Lbr Dank/2nd Weight Sex Delivery Anes PTL Lv   1 Term 24 40w1d / 01:02 3.53 kg M Vag-Spont EPI  [place]       Past Medical History  Past Medical History:   Diagnosis Date    Abnormal Pap smear of cervix         Past Surgical History   Past Surgical History:    Procedure Laterality Date    OTHER SURGICAL HISTORY  06/24/2022    No history of surgery       Social History  Social History     Tobacco Use    Smoking status: Former     Types: Cigarettes    Smokeless tobacco: Not on file   Substance Use Topics    Alcohol use: Not Currently     Substance and Sexual Activity   Drug Use Never       Allergies  Patient has no known allergies.     Medications  Medications Prior to Admission   Medication Sig Dispense Refill Last Dose    acetaminophen (Tylenol Extra Strength) 500 mg tablet Take 2 tablets (1,000 mg) by mouth every 6 hours if needed for mild pain (1 - 3).   3/26/2024 at 1200    ibuprofen (Motrin IB) 200 mg tablet Take 3 tablets (600 mg) by mouth every 6 hours if needed for mild pain (1 - 3).   3/26/2024 at 1200    pantoprazole (Protonix) 20 mg EC tablet Take 1 tablet (20 mg) by mouth once daily in the morning. Take before meals. Do not crush, chew, or split. 30 tablet 11 Past Month    prenatal no115/iron/folic acid (PRENATAL 19 ORAL) Take 1 tablet by mouth once daily.   3/26/2024 at 0600       Objective    Last Vitals  Temp Pulse Resp BP MAP O2 Sat   36.8 °C (98.2 °F) 68 16 119/60   (!) 94 %     Physical Examination  GENERAL: Examination reveals a well developed, well nourished, gravid female in no acute distress. She is alert and cooperative.  LUNGS: Normal effort  HEART: Normal rate  ABDOMEN: Fundus firm well below umbilicus, NT  EXTREMITIES: no redness or tenderness in the calves or thighs, no edema  SKIN: normal coloration and turgor, no rashes  NEUROLOGICAL: alert, oriented, normal speech, no focal findings or movement disorder noted, DTRs normal and symmetrical  PSYCHOLOGICAL: awake and alert; oriented to person, place, and time    Lab Review  Lab Results   Component Value Date    WBC 14.8 (H) 03/26/2024    HGB 10.7 (L) 03/26/2024    HCT 32.9 (L) 03/26/2024     03/26/2024     Lab Results   Component Value Date    GLUCOSE 109 (H) 03/26/2024      03/26/2024    K 3.8 03/26/2024     03/26/2024    CO2 24 03/26/2024    ANIONGAP 15 03/26/2024    BUN 18 03/26/2024    CREATININE 0.81 03/26/2024    EGFR >90 03/26/2024    CALCIUM 9.2 03/26/2024    ALBUMIN 3.9 03/26/2024    PROT 6.3 (L) 03/26/2024    ALKPHOS 97 03/26/2024    ALT 17 03/26/2024    AST 16 03/26/2024    BILITOT 0.2 03/26/2024

## 2024-04-01 ENCOUNTER — POSTPARTUM VISIT (OUTPATIENT)
Dept: OBSTETRICS AND GYNECOLOGY | Facility: CLINIC | Age: 29
End: 2024-04-01
Payer: COMMERCIAL

## 2024-04-01 VITALS
DIASTOLIC BLOOD PRESSURE: 78 MMHG | SYSTOLIC BLOOD PRESSURE: 110 MMHG | BODY MASS INDEX: 29.85 KG/M2 | HEIGHT: 70 IN | WEIGHT: 208.5 LBS

## 2024-04-01 DIAGNOSIS — O13.3 GESTATIONAL HYPERTENSION, THIRD TRIMESTER (HHS-HCC): Primary | ICD-10-CM

## 2024-04-01 PROBLEM — Z34.02 ENCOUNTER FOR SUPERVISION OF NORMAL FIRST PREGNANCY IN SECOND TRIMESTER (HHS-HCC): Status: RESOLVED | Noted: 2023-10-23 | Resolved: 2024-04-01

## 2024-04-01 PROCEDURE — 0503F POSTPARTUM CARE VISIT: CPT | Performed by: OBSTETRICS & GYNECOLOGY

## 2024-04-01 ASSESSMENT — EDINBURGH POSTNATAL DEPRESSION SCALE (EPDS)
I HAVE BLAMED MYSELF UNNECESSARILY WHEN THINGS WENT WRONG: NO, NEVER
I HAVE LOOKED FORWARD WITH ENJOYMENT TO THINGS: AS MUCH AS I EVER DID
I HAVE FELT SCARED OR PANICKY FOR NO GOOD REASON: NO, NOT AT ALL
I HAVE BEEN SO UNHAPPY THAT I HAVE HAD DIFFICULTY SLEEPING: NOT AT ALL
I HAVE BEEN ANXIOUS OR WORRIED FOR NO GOOD REASON: NO, NOT AT ALL
I HAVE BEEN SO UNHAPPY THAT I HAVE BEEN CRYING: NO, NEVER
THINGS HAVE BEEN GETTING ON TOP OF ME: NO, I HAVE BEEN COPING AS WELL AS EVER
TOTAL SCORE: 0
I HAVE BEEN ABLE TO LAUGH AND SEE THE FUNNY SIDE OF THINGS: AS MUCH AS I ALWAYS COULD
THE THOUGHT OF HARMING MYSELF HAS OCCURRED TO ME: NEVER
I HAVE FELT SAD OR MISERABLE: NO, NOT AT ALL

## 2024-04-01 NOTE — PROGRESS NOTES
Subjective   Patient ID: Cat Sarkar is a 28 y.o. female who presents for Postpartum Care. 1 week BP check for vaginal delivery at [facility] by Dr. Sarmiento. Baby boy, Peter, 7lb 13oz. Patient taking Nifedipine 30mg qam.  Last pap 8/22/22 normal HPV+(other).  HPI  Doing well. No complaints. All home BPS are wnl.     Review of Systems  Neg   Objective   Physical Exam        Assessment/Plan   Diagnoses and all orders for this visit:  Gestational hypertension, third trimester    Delivered .     OK to stop nifedipine rx    Keep appt in 4 weeks.    Paris Mejia MD 04/01/24 5:03 PM

## 2024-05-01 ENCOUNTER — POSTPARTUM VISIT (OUTPATIENT)
Dept: OBSTETRICS AND GYNECOLOGY | Facility: CLINIC | Age: 29
End: 2024-05-01
Payer: COMMERCIAL

## 2024-05-01 VITALS
BODY MASS INDEX: 28.83 KG/M2 | DIASTOLIC BLOOD PRESSURE: 84 MMHG | SYSTOLIC BLOOD PRESSURE: 118 MMHG | WEIGHT: 201.38 LBS | HEIGHT: 70 IN

## 2024-05-01 DIAGNOSIS — Z30.011 ENCOUNTER FOR INITIAL PRESCRIPTION OF CONTRACEPTIVE PILLS: ICD-10-CM

## 2024-05-01 PROCEDURE — 87624 HPV HI-RISK TYP POOLED RSLT: CPT

## 2024-05-01 PROCEDURE — 0503F POSTPARTUM CARE VISIT: CPT | Performed by: OBSTETRICS & GYNECOLOGY

## 2024-05-01 PROCEDURE — 88175 CYTOPATH C/V AUTO FLUID REDO: CPT

## 2024-05-01 RX ORDER — NORGESTIMATE AND ETHINYL ESTRADIOL 0.25-0.035
1 KIT ORAL DAILY
Qty: 28 TABLET | Refills: 11 | Status: SHIPPED | OUTPATIENT
Start: 2024-05-01 | End: 2025-05-01

## 2024-05-01 ASSESSMENT — EDINBURGH POSTNATAL DEPRESSION SCALE (EPDS)
I HAVE FELT SCARED OR PANICKY FOR NO GOOD REASON: NO, NOT AT ALL
THINGS HAVE BEEN GETTING ON TOP OF ME: NO, I HAVE BEEN COPING AS WELL AS EVER
I HAVE BEEN SO UNHAPPY THAT I HAVE HAD DIFFICULTY SLEEPING: NOT AT ALL
I HAVE LOOKED FORWARD WITH ENJOYMENT TO THINGS: AS MUCH AS I EVER DID
I HAVE BEEN SO UNHAPPY THAT I HAVE BEEN CRYING: NO, NEVER
THE THOUGHT OF HARMING MYSELF HAS OCCURRED TO ME: NEVER
I HAVE BLAMED MYSELF UNNECESSARILY WHEN THINGS WENT WRONG: NO, NEVER
TOTAL SCORE: 0
I HAVE BEEN ANXIOUS OR WORRIED FOR NO GOOD REASON: NO, NOT AT ALL
I HAVE BEEN ABLE TO LAUGH AND SEE THE FUNNY SIDE OF THINGS: AS MUCH AS I ALWAYS COULD
I HAVE FELT SAD OR MISERABLE: NO, NOT AT ALL

## 2024-05-01 NOTE — PROGRESS NOTES
"Patient presents for a 6 week postpartum visit   Delivered vaginally on 3/18/2024  Last PAP 2022 NEG HPV+    Leigh Keller MA     Postpartum Visit    HPI:  Pt presents for her 6 week postpartum visit s/p  on 3/18 complicated by postpartum preeclampsia without severe features .  She had a baby boy.  She is Bottle feeding. She was initially on nifedipine XL 30mg daily but is no longer taking that - stopped after about 1 week.  Has had normal Bps since.     ROS:  Denies the following: Complains of the following:    - episiotomy site pain   - incisional pain  - incisional drainage  - heavy bleeding  - irregular bleeding  - breast pain  - cracked nipples  - breastfeeding problems  - abdominal pain  - vaginal discharge  - shortness of breath  - chest pain  - back pain  - leg pain  - depression  - suicidal ideation  - nausea  - vomiting  - fever  - pain with urination  - tiredness or fatigue  - headache no pertinent positives      O:  /84 (BP Location: Right arm, Patient Position: Sitting, BP Cuff Size: Adult)   Ht 1.778 m (5' 10\")   Wt 91.3 kg (201 lb 6 oz)   Breastfeeding No   BMI 28.89 kg/m²     General Appearance   - consistent with stated age, well groomed and cooperative    Integumentary  - skin warm and dry without rash    Head and Neck  - normalocephalic and neck supple    Chest and Lung Exam  - normal breathing effort, no respiratory distress    Abdomen  - soft, nontender and no hepatomegaly, splenomegaly, or mass    Female Genitourinary  - vulva normal without rash or lesion, normal vaginal rugae, no vaginal discharge, uterus normal size & no palpable masses, no adnexal mass, no adnexal tenderness, no cervical motion tenderness    Peripheral Vascular  - no edema present    A/P:   Pt is a 28 y.o.  who presents for 6 week postpartum visit.  Doing well overall postpartum.  Coping well with new baby at home.  [place]  Depression Scale Total: 0.  Bottle feeding.. Baby doing well.  " Contraception discussed - wants to use combination OCPs.  Prescription sent.  Pap done today. f/u for annual exam or as needed.

## 2024-05-15 LAB
CYTOLOGY CMNT CVX/VAG CYTO-IMP: NORMAL
HPV HR 12 DNA GENITAL QL NAA+PROBE: NEGATIVE
HPV HR GENOTYPES PNL CVX NAA+PROBE: NEGATIVE
HPV16 DNA SPEC QL NAA+PROBE: NEGATIVE
HPV18 DNA SPEC QL NAA+PROBE: NEGATIVE
LAB AP HPV GENOTYPE QUESTION: YES
LAB AP HPV HR: NORMAL
LABORATORY COMMENT REPORT: NORMAL
PATH REPORT.TOTAL CANCER: NORMAL

## 2024-11-25 DIAGNOSIS — Z00.00 ANNUAL PHYSICAL EXAM: Primary | ICD-10-CM

## 2025-06-13 ENCOUNTER — APPOINTMENT (OUTPATIENT)
Dept: OBSTETRICS AND GYNECOLOGY | Facility: CLINIC | Age: 30
End: 2025-06-13
Payer: COMMERCIAL

## 2025-06-13 VITALS — BODY MASS INDEX: 27.41 KG/M2 | DIASTOLIC BLOOD PRESSURE: 76 MMHG | WEIGHT: 191 LBS | SYSTOLIC BLOOD PRESSURE: 109 MMHG

## 2025-06-13 DIAGNOSIS — Z87.59 HISTORY OF POSTPARTUM PRE-ECLAMPSIA: ICD-10-CM

## 2025-06-13 DIAGNOSIS — Z86.79 HISTORY OF POSTPARTUM PRE-ECLAMPSIA: ICD-10-CM

## 2025-06-13 DIAGNOSIS — Z3A.08 8 WEEKS GESTATION OF PREGNANCY (HHS-HCC): Primary | ICD-10-CM

## 2025-06-13 DIAGNOSIS — Z34.91 PRENATAL CARE IN FIRST TRIMESTER, UNSPECIFIED GRAVIDITY: ICD-10-CM

## 2025-06-13 PROBLEM — R00.2 PALPITATIONS: Status: RESOLVED | Noted: 2023-10-31 | Resolved: 2025-06-13

## 2025-06-13 PROBLEM — K21.9 GASTROESOPHAGEAL REFLUX DISEASE: Status: RESOLVED | Noted: 2024-03-17 | Resolved: 2025-06-13

## 2025-06-13 PROCEDURE — 0500F INITIAL PRENATAL CARE VISIT: CPT | Performed by: OBSTETRICS & GYNECOLOGY

## 2025-06-13 ASSESSMENT — EDINBURGH POSTNATAL DEPRESSION SCALE (EPDS)
I HAVE BEEN ANXIOUS OR WORRIED FOR NO GOOD REASON: NO, NOT AT ALL
I HAVE BEEN SO UNHAPPY THAT I HAVE BEEN CRYING: NO, NEVER
I HAVE LOOKED FORWARD WITH ENJOYMENT TO THINGS: AS MUCH AS I EVER DID
THE THOUGHT OF HARMING MYSELF HAS OCCURRED TO ME: NEVER
I HAVE BEEN ABLE TO LAUGH AND SEE THE FUNNY SIDE OF THINGS: AS MUCH AS I ALWAYS COULD
THINGS HAVE BEEN GETTING ON TOP OF ME: NO, I HAVE BEEN COPING AS WELL AS EVER
I HAVE FELT SCARED OR PANICKY FOR NO GOOD REASON: NO, NOT AT ALL
I HAVE FELT SAD OR MISERABLE: NO, NOT AT ALL
I HAVE BLAMED MYSELF UNNECESSARILY WHEN THINGS WENT WRONG: NO, NEVER
TOTAL SCORE: 0
I HAVE BEEN SO UNHAPPY THAT I HAVE HAD DIFFICULTY SLEEPING: NOT AT ALL

## 2025-06-13 NOTE — PROGRESS NOTES
Routine prenatal visit     Subjective    HPI:  29 y.o.  at 8.0 weeks gestational age by sure LMP.   Well-known to me from prior pregnancy.  Planned and desired pregnancy.  Sure LMP and regular periods.  USN done today - CRL c/w LMP dating.  EDC per LMP - .  PMH/PSH uncomplicated.  OB history significant for uncomplicated  after PROM at 40.0.  Had postpartum preeclampsia last pregnancy and was on nifedipine XL 30mg daily for a short while.  Feeling generally well so far.  Taking OTC PNV.  BMI today Body mass index is 27.41 kg/m². Discussed optimal weight gain in pregnancy.  Discussed genetic screening options and carrier screening.  Wants cfDNA screening - will do next visit with routine labs.  Pt aware of collaborative care model and group practice.  Start ASA next visit. Questions answered.    Objective    Vital Signs  /76   Wt 86.6 kg (191 lb)   LMP 2025   BMI 27.41 kg/m²     Cat Sarkar is a 29 y.o. yo  at 8w0d here for the following concerns which we addressed today:     Medical Problems       Problem List       8 weeks gestation of pregnancy ([facility])    Overview Addendum 2025  1:54 PM by Tiffanie Sarmiento MD   Desired provider in labor: [] CNM  [x] Physician   [] Either Acceptable  [x] Blood Products: [x] Yes, accepts [] No, needs counseling  [x] Initial BMI: 27.26   [] Prenatal Labs: <> next visit   [x] Cervical Cancer Screening up to date: 2024 - normal, negative HPV   [x] Rh status: positive   [x] Screen for IPV and Substance Use Risk  [] Genetic Screening (cfDNA):  <> cfDNA screening next visit   [] First Trimester Anatomy Screen (11-13.6 wks): <> order placed and she will call to schedule   [] Baby ASA: <> start next visit   [x] Pregnancy dated by: sure LMP c/w 7 week USN     [] Anatomy US: (19-20 wks)  [] Federal Sterilization consent signed (if indicated):  [] 1hr GCT at 24-28wks:  [] Rhogam (if indicated):   [] Fetal Surveillance (if indicated):  [] Tdap  (27-32 wks, may be given up to 36 wks if initial window missed):   [] RSV (32-36 wks) (Sept. to end of Jan):     [] Feeding Intentions:  [] Postpartum Birth control method:   [] GBS at 36 - 37 wks:  [] 39 weeks discussion of IOL vs. Expectant management:  [] Mode of delivery ( anticipated ):           History of postpartum pre-eclampsia    Overview Signed 6/13/2025  1:55 PM by Tiffanie Sarmiento MD   - was on nifedipine XL 30mg daily postpartum              Follow up in 4 week(s).

## 2025-06-15 LAB
BACTERIA UR CULT: ABNORMAL
C TRACH RRNA SPEC QL NAA+PROBE: NOT DETECTED
N GONORRHOEA RRNA SPEC QL NAA+PROBE: NOT DETECTED
QUEST GC CT AMPLIFIED (ALWAYS MESSAGE): NORMAL

## 2025-06-16 DIAGNOSIS — O99.891 BACTERIURIA, ASYMPTOMATIC IN PREGNANCY (HHS-HCC): Primary | ICD-10-CM

## 2025-06-16 DIAGNOSIS — R82.71 BACTERIURIA, ASYMPTOMATIC IN PREGNANCY (HHS-HCC): Primary | ICD-10-CM

## 2025-06-16 RX ORDER — NITROFURANTOIN 25; 75 MG/1; MG/1
100 CAPSULE ORAL EVERY 12 HOURS SCHEDULED
Qty: 14 CAPSULE | Refills: 0 | Status: SHIPPED | OUTPATIENT
Start: 2025-06-16 | End: 2025-06-23

## 2025-07-08 PROBLEM — Z3A.12 12 WEEKS GESTATION OF PREGNANCY (HHS-HCC): Status: ACTIVE | Noted: 2025-06-13

## 2025-07-11 ENCOUNTER — APPOINTMENT (OUTPATIENT)
Dept: OBSTETRICS AND GYNECOLOGY | Facility: CLINIC | Age: 30
End: 2025-07-11
Payer: COMMERCIAL

## 2025-07-11 VITALS — BODY MASS INDEX: 26.89 KG/M2 | SYSTOLIC BLOOD PRESSURE: 109 MMHG | WEIGHT: 187.4 LBS | DIASTOLIC BLOOD PRESSURE: 71 MMHG

## 2025-07-11 DIAGNOSIS — Z86.79 HISTORY OF POSTPARTUM PRE-ECLAMPSIA: ICD-10-CM

## 2025-07-11 DIAGNOSIS — Z87.59 HISTORY OF POSTPARTUM PRE-ECLAMPSIA: ICD-10-CM

## 2025-07-11 DIAGNOSIS — Z3A.12 12 WEEKS GESTATION OF PREGNANCY (HHS-HCC): Primary | ICD-10-CM

## 2025-07-11 NOTE — PROGRESS NOTES
Ob Visit  23     SUBJECTIVE      HPI: Cat Sarkar is a 29 y.o.  at 12w0d here for RPNV.  She has no contractions, bleeding, or LOF. Reports no fetal movement yet. Patient reports feeling well.        OBJECTIVE  Visit Vitals  /71   Wt 85 kg (187 lb 6.4 oz)   LMP 2025   BMI 26.89 kg/m²   OB Status Pregnant   Smoking Status Former   BSA 2.05 m²            ASSESSMENT & PLAN    Cat Sarkar is a 29 y.o.  at 12w0d here for the following concerns we addressed today:    Problem List Items Addressed This Visit       12 weeks gestation of pregnancy ([facility]) - Primary    Overview   Desired provider in labor: [] CNM  [x] Physician   [] Either Acceptable  [x] Blood Products: [x] Yes, accepts [] No, needs counseling  [x] Initial BMI: 27.26   [x] Prenatal Labs: ordered and drawn 25  [x] Cervical Cancer Screening up to date: 2024 - normal, negative HPV   [x] Rh status: positive   [x] Screen for IPV and Substance Use Risk  [x] Genetic Screening (cfDNA):  ordered/drawn   [x] First Trimester Anatomy Screen (11-13.6 wks): scheduled   [x] Baby ASA: advised to start 25  [x] Pregnancy dated by: sure LMP c/w 7 week USN     [] Anatomy US: (19-20 wks)  [] Federal Sterilization consent signed (if indicated):  [] 1hr GCT at 24-28wks:  [] Fetal Surveillance (if indicated):  [] Tdap (27-32 wks, may be given up to 36 wks if initial window missed):   [] RSV (32-36 wks) (Sept. to end of ):     [] Feeding Intentions:  [] Postpartum Birth control method:   [] GBS at 36 - 37 wks:  [] 39 weeks discussion of IOL vs. Expectant management:  [] Mode of delivery ( anticipated ):           Relevant Orders    CBC Anemia Panel With Reflex,Pregnancy    Hemoglobin Identification with Path Review    Hepatitis B Core Antibody, Total    Hepatitis B surface Ag    Hepatitis C Antibody    HIV 1/2 Antigen/Antibody Screen with Reflex to Confirmation    Rubella IgG    Syphilis Screen with Reflex    Type And Screen  Is this order related to pregnancy or an upcoming surgery? Yes; Where will this surgery/del[place] be performed? [facility] [place]; What is the date of the surgery? 1/26/2026; Has this patient ever had a transfusion? Unknown; H...    Myriad Prequel Prenatal Screen    History of postpartum pre-eclampsia    Overview   - was on nifedipine XL 30mg daily postpartum               RTC in 4 weeks      Gricelda Liz MD

## 2025-07-14 ENCOUNTER — LAB (OUTPATIENT)
Dept: LAB | Facility: HOSPITAL | Age: 30
End: 2025-07-14
Payer: COMMERCIAL

## 2025-07-14 LAB
ABO GROUP (TYPE) IN BLOOD: NORMAL
ANTIBODY SCREEN: NORMAL
ERYTHROCYTE [DISTWIDTH] IN BLOOD BY AUTOMATED COUNT: 12 % (ref 11.5–14.5)
HCT VFR BLD AUTO: 36.7 % (ref 36–46)
HGB BLD-MCNC: 12.4 G/DL (ref 12–16)
MCH RBC QN AUTO: 30.5 PG (ref 26–34)
MCHC RBC AUTO-ENTMCNC: 33.8 G/DL (ref 32–36)
MCV RBC AUTO: 90 FL (ref 80–100)
NRBC BLD-RTO: 0 /100 WBCS (ref 0–0)
PLATELET # BLD AUTO: 254 X10*3/UL (ref 150–450)
RBC # BLD AUTO: 4.07 X10*6/UL (ref 4–5.2)
REFLEX ADDED, ANEMIA PANEL: NORMAL
RH FACTOR (ANTIGEN D): NORMAL
WBC # BLD AUTO: 8.7 X10*3/UL (ref 4.4–11.3)

## 2025-07-14 PROCEDURE — 83020 HEMOGLOBIN ELECTROPHORESIS: CPT

## 2025-07-14 PROCEDURE — 85027 COMPLETE CBC AUTOMATED: CPT

## 2025-07-14 PROCEDURE — 86900 BLOOD TYPING SEROLOGIC ABO: CPT

## 2025-07-14 PROCEDURE — 86850 RBC ANTIBODY SCREEN: CPT

## 2025-07-14 PROCEDURE — 86901 BLOOD TYPING SEROLOGIC RH(D): CPT

## 2025-07-14 PROCEDURE — 83021 HEMOGLOBIN CHROMOTOGRAPHY: CPT

## 2025-07-16 LAB
HEMOGLOBIN A2: 2.9 % (ref 2–3.5)
HEMOGLOBIN A: 95.6 % (ref 95.8–98)
HEMOGLOBIN F: 1.5 % (ref 0–2)
HEMOGLOBIN IDENTIFICATION INTERPRETATION: NORMAL
PATH REVIEW-HGB IDENTIFICATION: ABNORMAL

## 2025-07-17 ENCOUNTER — HOSPITAL ENCOUNTER (OUTPATIENT)
Dept: RADIOLOGY | Facility: CLINIC | Age: 30
Discharge: HOME | End: 2025-07-17
Payer: COMMERCIAL

## 2025-07-17 DIAGNOSIS — Z34.91 PRENATAL CARE IN FIRST TRIMESTER, UNSPECIFIED GRAVIDITY: ICD-10-CM

## 2025-07-17 LAB
HBV SURFACE AG SERPL QL IA: NORMAL
HCV AB SERPL QL IA: NORMAL
HIV 1+2 AB+HIV1 P24 AG SERPL QL IA: NORMAL
HIV 1+2 AB+HIV1 P24 AG SERPL QL IA: NORMAL
RUBV IGG SERPL IA-ACNC: 27.2 INDEX
T PALLIDUM AB SER QL IA: NEGATIVE

## 2025-07-17 PROCEDURE — 76813 OB US NUCHAL MEAS 1 GEST: CPT

## 2025-07-21 LAB
COMMENTS - MP RESULT TYPE: NORMAL
SCAN RESULT: NORMAL

## 2025-08-04 ENCOUNTER — APPOINTMENT (OUTPATIENT)
Dept: OBSTETRICS AND GYNECOLOGY | Facility: CLINIC | Age: 30
End: 2025-08-04
Payer: COMMERCIAL

## 2025-08-04 VITALS — BODY MASS INDEX: 27.72 KG/M2 | DIASTOLIC BLOOD PRESSURE: 74 MMHG | SYSTOLIC BLOOD PRESSURE: 112 MMHG | WEIGHT: 193.2 LBS

## 2025-08-04 DIAGNOSIS — Z87.59 HISTORY OF POSTPARTUM PRE-ECLAMPSIA: ICD-10-CM

## 2025-08-04 DIAGNOSIS — Z86.79 HISTORY OF POSTPARTUM PRE-ECLAMPSIA: ICD-10-CM

## 2025-08-04 DIAGNOSIS — Z3A.15 15 WEEKS GESTATION OF PREGNANCY (HHS-HCC): Primary | ICD-10-CM

## 2025-08-04 PROCEDURE — 0501F PRENATAL FLOW SHEET: CPT | Performed by: OBSTETRICS & GYNECOLOGY

## 2025-08-04 RX ORDER — NAPROXEN SODIUM 220 MG/1
162 TABLET, FILM COATED ORAL DAILY
COMMUNITY

## 2025-08-04 NOTE — PROGRESS NOTES
Patient presents for OBFU  C/O: started baby ASA at night, gets very dizzy in the mornings. Very fatigued.      Matilde Blanco MA II    Routine prenatal visit     Subjective    HPI:  Pt c/o feeling dizzy in the morning when she stands up quickly, etc.  BP today low- normal. Suspect orthostatic hypotension as the cause of her intermittent dizziness.   Encouraged her to increase her PO fluid intake.  Has her anatomy USN scheduled.      Objective    Vital Signs  /74   Wt 87.6 kg (193 lb 3.2 oz)   LMP 2025   BMI 27.72 kg/m²     Cat Sarkar is a 29 y.o. yo  at 15w3d here for the following concerns which we addressed today:     Medical Problems       Problem List       15 weeks gestation of pregnancy ([facility])    Overview Addendum 2025  4:55 PM by Tiffanie Sarmiento MD   Desired provider in labor: [] CNM  [x] Physician   [] Either Acceptable  [x] Blood Products: [x] Yes, accepts [] No, needs counseling  [x] Initial BMI: 27.26   [x] Prenatal Labs: unremarkable, 25  [x] Cervical Cancer Screening up to date: 2024 - normal, negative HPV   [x] Rh status: positive   [x] Screen for IPV and Substance Use Risk  [x] Genetic Screening (cfDNA):  low risk 25, boy  [x] First Trimester Anatomy Screen (11-13.6 wks): WNL  [x] Baby ASA: taking   [x] Pregnancy dated by: sure LMP c/w 7 week USN     [] Anatomy US: (19-20 wks): scheduled    [] Federal Sterilization consent signed (if indicated):  [] 1hr GCT at 24-28wks:  [] Fetal Surveillance (if indicated):  [] Tdap (27-32 wks, may be given up to 36 wks if initial window missed):   [] RSV (32-36 wks) (Sept. to end of ):     [] Feeding Intentions:  [] Postpartum Birth control method:   [] GBS at 36 - 37 wks:  [] 39 weeks discussion of IOL vs. Expectant management:  [] Mode of delivery ( anticipated ):           History of postpartum pre-eclampsia    Overview Signed 2025  1:55 PM by Tiffanie Sarmiento MD   - was on nifedipine XL 30mg daily  postpartum              Follow up in 4 week(s).

## 2025-09-05 ENCOUNTER — APPOINTMENT (OUTPATIENT)
Dept: RADIOLOGY | Facility: CLINIC | Age: 30
End: 2025-09-05
Payer: COMMERCIAL

## 2025-09-05 ENCOUNTER — APPOINTMENT (OUTPATIENT)
Dept: OBSTETRICS AND GYNECOLOGY | Facility: CLINIC | Age: 30
End: 2025-09-05
Payer: COMMERCIAL

## 2025-09-05 PROBLEM — Z3A.20 20 WEEKS GESTATION OF PREGNANCY (HHS-HCC): Status: ACTIVE | Noted: 2025-06-13

## 2025-09-29 ENCOUNTER — APPOINTMENT (OUTPATIENT)
Dept: OBSTETRICS AND GYNECOLOGY | Facility: CLINIC | Age: 30
End: 2025-09-29
Payer: COMMERCIAL

## 2025-10-29 ENCOUNTER — APPOINTMENT (OUTPATIENT)
Dept: OBSTETRICS AND GYNECOLOGY | Facility: CLINIC | Age: 30
End: 2025-10-29
Payer: COMMERCIAL

## 2025-11-17 ENCOUNTER — APPOINTMENT (OUTPATIENT)
Dept: OBSTETRICS AND GYNECOLOGY | Facility: CLINIC | Age: 30
End: 2025-11-17
Payer: COMMERCIAL